# Patient Record
Sex: MALE | Race: WHITE | NOT HISPANIC OR LATINO | Employment: FULL TIME | URBAN - NONMETROPOLITAN AREA
[De-identification: names, ages, dates, MRNs, and addresses within clinical notes are randomized per-mention and may not be internally consistent; named-entity substitution may affect disease eponyms.]

---

## 2018-01-08 ENCOUNTER — LAB (OUTPATIENT)
Dept: LAB | Facility: HOSPITAL | Age: 28
End: 2018-01-08

## 2018-01-08 ENCOUNTER — TRANSCRIBE ORDERS (OUTPATIENT)
Dept: ADMINISTRATIVE | Facility: HOSPITAL | Age: 28
End: 2018-01-08

## 2018-01-08 DIAGNOSIS — Z00.00 ROUTINE GENERAL MEDICAL EXAMINATION AT A HEALTH CARE FACILITY: ICD-10-CM

## 2018-01-08 DIAGNOSIS — Z00.00 ROUTINE GENERAL MEDICAL EXAMINATION AT A HEALTH CARE FACILITY: Primary | ICD-10-CM

## 2018-01-08 LAB
ALBUMIN SERPL-MCNC: 4.2 G/DL (ref 3.5–5)
ALBUMIN/GLOB SERPL: 1.1 G/DL (ref 1.1–2.5)
ALP SERPL-CCNC: 60 U/L (ref 24–120)
ALT SERPL W P-5'-P-CCNC: 124 U/L (ref 0–54)
ANION GAP SERPL CALCULATED.3IONS-SCNC: 7 MMOL/L (ref 4–13)
AST SERPL-CCNC: 57 U/L (ref 7–45)
AUTO MIXED CELLS #: 0.9 10*3/MM3 (ref 0.1–2.6)
AUTO MIXED CELLS %: 13.1 % (ref 0.1–24)
BILIRUB SERPL-MCNC: 0.6 MG/DL (ref 0.1–1)
BUN BLD-MCNC: 9 MG/DL (ref 5–21)
BUN/CREAT SERPL: 9
CALCIUM SPEC-SCNC: 9.8 MG/DL (ref 8.4–10.4)
CHLORIDE SERPL-SCNC: 101 MMOL/L (ref 98–110)
CHOLEST SERPL-MCNC: 260 MG/DL (ref 130–200)
CO2 SERPL-SCNC: 32 MMOL/L (ref 24–31)
CREAT BLD-MCNC: 1 MG/DL (ref 0.5–1.4)
ERYTHROCYTE [DISTWIDTH] IN BLOOD BY AUTOMATED COUNT: 11.4 % (ref 12–15)
GFR SERPL CREATININE-BSD FRML MDRD: 90 ML/MIN/1.73
GLOBULIN UR ELPH-MCNC: 3.9 GM/DL
GLUCOSE BLD-MCNC: 97 MG/DL (ref 70–100)
HCT VFR BLD AUTO: 46.6 % (ref 40–52)
HDLC SERPL-MCNC: 45 MG/DL
HGB BLD-MCNC: 16.2 G/DL (ref 14–18)
LDLC SERPL CALC-MCNC: 143 MG/DL (ref 0–99)
LDLC/HDLC SERPL: 3.17 {RATIO}
LYMPHOCYTES # BLD AUTO: 2.2 10*3/MM3 (ref 0.8–7)
LYMPHOCYTES NFR BLD AUTO: 32.1 % (ref 15–45)
MCH RBC QN AUTO: 30 PG (ref 28–32)
MCHC RBC AUTO-ENTMCNC: 34.8 G/DL (ref 33–36)
MCV RBC AUTO: 86.3 FL (ref 82–95)
NEUTROPHILS # BLD AUTO: 3.7 10*3/MM3 (ref 1.5–8.3)
NEUTROPHILS NFR BLD AUTO: 54.8 % (ref 39–78)
PLATELET # BLD AUTO: 231 10*3/MM3 (ref 130–400)
PMV BLD AUTO: 9.4 FL (ref 6–12)
POTASSIUM BLD-SCNC: 3.9 MMOL/L (ref 3.5–5.3)
PROT SERPL-MCNC: 8.1 G/DL (ref 6.3–8.7)
RBC # BLD AUTO: 5.4 10*6/MM3 (ref 4.2–5.4)
SODIUM BLD-SCNC: 140 MMOL/L (ref 135–145)
TRIGL SERPL-MCNC: 361 MG/DL (ref 0–149)
VLDLC SERPL-MCNC: 72.2 MG/DL
WBC NRBC COR # BLD: 6.8 10*3/MM3 (ref 4.8–10.8)

## 2018-01-08 PROCEDURE — 80061 LIPID PANEL: CPT

## 2018-01-08 PROCEDURE — 85025 COMPLETE CBC W/AUTO DIFF WBC: CPT | Performed by: NURSE PRACTITIONER

## 2018-01-08 PROCEDURE — 80053 COMPREHEN METABOLIC PANEL: CPT | Performed by: NURSE PRACTITIONER

## 2018-01-08 PROCEDURE — 36415 COLL VENOUS BLD VENIPUNCTURE: CPT

## 2018-11-19 ENCOUNTER — OFFICE VISIT (OUTPATIENT)
Dept: URGENT CARE | Age: 28
End: 2018-11-19

## 2018-11-19 VITALS
HEART RATE: 95 BPM | TEMPERATURE: 97.1 F | DIASTOLIC BLOOD PRESSURE: 88 MMHG | BODY MASS INDEX: 36 KG/M2 | HEIGHT: 66 IN | WEIGHT: 224 LBS | SYSTOLIC BLOOD PRESSURE: 124 MMHG | RESPIRATION RATE: 18 BRPM | OXYGEN SATURATION: 96 %

## 2018-11-19 DIAGNOSIS — F17.200 SMOKER: ICD-10-CM

## 2018-11-19 DIAGNOSIS — R05.9 COUGH: ICD-10-CM

## 2018-11-19 DIAGNOSIS — J40 BRONCHITIS: Primary | ICD-10-CM

## 2018-11-19 PROCEDURE — 99202 OFFICE O/P NEW SF 15 MIN: CPT | Performed by: SPECIALIST

## 2018-11-19 PROCEDURE — 96372 THER/PROPH/DIAG INJ SC/IM: CPT | Performed by: SPECIALIST

## 2018-11-19 PROCEDURE — 94640 AIRWAY INHALATION TREATMENT: CPT | Performed by: SPECIALIST

## 2018-11-19 RX ORDER — PREDNISONE 10 MG/1
10 TABLET ORAL DAILY
Qty: 5 TABLET | Refills: 0 | Status: SHIPPED | OUTPATIENT
Start: 2018-11-19 | End: 2018-11-24

## 2018-11-19 RX ORDER — AMOXICILLIN 875 MG/1
875 TABLET, COATED ORAL 2 TIMES DAILY
Qty: 20 TABLET | Refills: 0 | Status: SHIPPED | OUTPATIENT
Start: 2018-11-19 | End: 2018-11-29

## 2018-11-19 RX ORDER — ALBUTEROL SULFATE 2.5 MG/3ML
2.5 SOLUTION RESPIRATORY (INHALATION) ONCE
Status: COMPLETED | OUTPATIENT
Start: 2018-11-19 | End: 2018-11-19

## 2018-11-19 RX ORDER — DEXAMETHASONE SODIUM PHOSPHATE 10 MG/ML
10 INJECTION INTRAMUSCULAR; INTRAVENOUS ONCE
Status: COMPLETED | OUTPATIENT
Start: 2018-11-19 | End: 2018-11-19

## 2018-11-19 RX ADMIN — ALBUTEROL SULFATE 2.5 MG: 2.5 SOLUTION RESPIRATORY (INHALATION) at 17:57

## 2018-11-19 RX ADMIN — DEXAMETHASONE SODIUM PHOSPHATE 10 MG: 10 INJECTION INTRAMUSCULAR; INTRAVENOUS at 17:57

## 2018-11-19 ASSESSMENT — ENCOUNTER SYMPTOMS: COUGH: 1

## 2018-11-20 NOTE — PROGRESS NOTES
of: December 6, 2017  Content Version: 11.8  © 6160-2977 [employer], [employer]. Care instructions adapted under license by [facility] ([facility]). If you have questions about a medical condition or this instruction, always ask your healthcare professional. [address]  any warranty or liability for your use of this information. 1.  Stop smoking  2. Push fluids  3. If your symptoms persist or worsen, return here or follow up at ER you may need a chest x-ray  4.   Complete all of your antibiotics          Electronically signed by ZAY Addison NP on 11/19/2018 at 6:03 PM

## 2018-11-29 ENCOUNTER — HOSPITAL ENCOUNTER (OUTPATIENT)
Dept: GENERAL RADIOLOGY | Age: 28
Discharge: HOME | End: 2018-11-29

## 2018-11-29 ENCOUNTER — OFFICE VISIT (OUTPATIENT)
Dept: URGENT CARE | Age: 28
End: 2018-11-29

## 2018-11-29 VITALS
DIASTOLIC BLOOD PRESSURE: 90 MMHG | TEMPERATURE: 97.7 F | RESPIRATION RATE: 18 BRPM | WEIGHT: 224.6 LBS | HEART RATE: 105 BPM | SYSTOLIC BLOOD PRESSURE: 145 MMHG | BODY MASS INDEX: 36.1 KG/M2 | OXYGEN SATURATION: 97 % | HEIGHT: 66 IN

## 2018-11-29 DIAGNOSIS — R07.81 RIB PAIN ON RIGHT SIDE: ICD-10-CM

## 2018-11-29 DIAGNOSIS — S22.31XA CLOSED FRACTURE OF ONE RIB OF RIGHT SIDE, INITIAL ENCOUNTER: Primary | ICD-10-CM

## 2018-11-29 DIAGNOSIS — F17.200 SMOKER: ICD-10-CM

## 2018-11-29 PROCEDURE — 71100 X-RAY EXAM RIBS UNI 2 VIEWS: CPT

## 2018-11-29 ASSESSMENT — ENCOUNTER SYMPTOMS: COUGH: 1

## 2020-07-14 ENCOUNTER — LAB REQUISITION (OUTPATIENT)
Dept: LAB | Facility: HOSPITAL | Age: 30
End: 2020-07-14

## 2020-07-14 DIAGNOSIS — Z00.00 ENCOUNTER FOR GENERAL ADULT MEDICAL EXAMINATION WITHOUT ABNORMAL FINDINGS: ICD-10-CM

## 2020-07-14 LAB — SARS-COV-2 ORF1AB RESP QL NAA+PROBE: NOT DETECTED

## 2020-07-14 PROCEDURE — U0004 COV-19 TEST NON-CDC HGH THRU: HCPCS | Performed by: NURSE PRACTITIONER

## 2020-07-15 ENCOUNTER — LAB REQUISITION (OUTPATIENT)
Dept: LAB | Facility: HOSPITAL | Age: 30
End: 2020-07-15

## 2020-07-15 DIAGNOSIS — Z00.00 ENCOUNTER FOR GENERAL ADULT MEDICAL EXAMINATION WITHOUT ABNORMAL FINDINGS: ICD-10-CM

## 2020-07-30 ENCOUNTER — LAB REQUISITION (OUTPATIENT)
Dept: LAB | Facility: HOSPITAL | Age: 30
End: 2020-07-30

## 2020-07-30 DIAGNOSIS — Z00.00 ENCOUNTER FOR GENERAL ADULT MEDICAL EXAMINATION WITHOUT ABNORMAL FINDINGS: ICD-10-CM

## 2020-08-07 ENCOUNTER — LAB REQUISITION (OUTPATIENT)
Dept: LAB | Facility: HOSPITAL | Age: 30
End: 2020-08-07

## 2020-08-07 DIAGNOSIS — Z00.00 ENCOUNTER FOR GENERAL ADULT MEDICAL EXAMINATION WITHOUT ABNORMAL FINDINGS: ICD-10-CM

## 2021-06-08 ENCOUNTER — HOSPITAL ENCOUNTER (OUTPATIENT)
Age: 31
Setting detail: OBSERVATION
Discharge: STILL ADMITTED OR ADMINISTRATIVE | End: 2021-06-09
Attending: EMERGENCY MEDICINE | Admitting: FAMILY MEDICINE

## 2021-06-08 DIAGNOSIS — Z91.89 AT RISK FOR WITHDRAWAL: ICD-10-CM

## 2021-06-08 DIAGNOSIS — F10.920 ACUTE ALCOHOLIC INTOXICATION WITHOUT COMPLICATION (HCC): Primary | ICD-10-CM

## 2021-06-08 DIAGNOSIS — F32.A DEPRESSION, UNSPECIFIED DEPRESSION TYPE: ICD-10-CM

## 2021-06-08 LAB
ALBUMIN SERPL-MCNC: 4.5 G/DL (ref 3.5–5.2)
ALP BLD-CCNC: 70 U/L (ref 40–130)
ALT SERPL-CCNC: 107 U/L (ref 5–41)
AMPHETAMINE SCREEN, URINE: NEGATIVE
ANION GAP SERPL CALCULATED.3IONS-SCNC: 13 MMOL/L (ref 7–19)
AST SERPL-CCNC: 111 U/L (ref 5–40)
BACTERIA: NEGATIVE /HPF
BARBITURATE SCREEN URINE: NEGATIVE
BASOPHILS ABSOLUTE: 0.1 K/UL (ref 0–0.2)
BASOPHILS RELATIVE PERCENT: 1.1 % (ref 0–1)
BENZODIAZEPINE SCREEN, URINE: NEGATIVE
BILIRUB SERPL-MCNC: 0.6 MG/DL (ref 0.2–1.2)
BILIRUBIN URINE: NEGATIVE
BLOOD, URINE: NEGATIVE
BUN BLDV-MCNC: 4 MG/DL (ref 6–20)
CALCIUM SERPL-MCNC: 9 MG/DL (ref 8.6–10)
CANNABINOID SCREEN URINE: NEGATIVE
CHLORIDE BLD-SCNC: 104 MMOL/L (ref 98–111)
CLARITY: CLEAR
CO2: 25 MMOL/L (ref 22–29)
COCAINE METABOLITE SCREEN URINE: NEGATIVE
COLOR: YELLOW
CREAT SERPL-MCNC: 0.7 MG/DL (ref 0.5–1.2)
CRYSTALS, UA: ABNORMAL /HPF
EOSINOPHILS ABSOLUTE: 0.1 K/UL (ref 0–0.6)
EOSINOPHILS RELATIVE PERCENT: 1.5 % (ref 0–5)
EPITHELIAL CELLS, UA: 1 /HPF (ref 0–5)
ETHANOL: 328 MG/DL (ref 0–0.08)
GFR AFRICAN AMERICAN: >59
GFR NON-AFRICAN AMERICAN: >60
GLUCOSE BLD-MCNC: 100 MG/DL (ref 74–109)
GLUCOSE URINE: NEGATIVE MG/DL
HCT VFR BLD CALC: 43.1 % (ref 42–52)
HEMOGLOBIN: 15 G/DL (ref 14–18)
HYALINE CASTS: 5 /HPF (ref 0–8)
IMMATURE GRANULOCYTES #: 0 K/UL
KETONES, URINE: ABNORMAL MG/DL
LEUKOCYTE ESTERASE, URINE: NEGATIVE
LIPASE: 84 U/L (ref 13–60)
LYMPHOCYTES ABSOLUTE: 1.6 K/UL (ref 1.1–4.5)
LYMPHOCYTES RELATIVE PERCENT: 35.5 % (ref 20–40)
Lab: NORMAL
MCH RBC QN AUTO: 32.2 PG (ref 27–31)
MCHC RBC AUTO-ENTMCNC: 34.8 G/DL (ref 33–37)
MCV RBC AUTO: 92.5 FL (ref 80–94)
MONOCYTES ABSOLUTE: 0.4 K/UL (ref 0–0.9)
MONOCYTES RELATIVE PERCENT: 9.3 % (ref 0–10)
NEUTROPHILS ABSOLUTE: 2.4 K/UL (ref 1.5–7.5)
NEUTROPHILS RELATIVE PERCENT: 52.6 % (ref 50–65)
NITRITE, URINE: NEGATIVE
OPIATE SCREEN URINE: NEGATIVE
PDW BLD-RTO: 13.2 % (ref 11.5–14.5)
PH UA: 5 (ref 5–8)
PLATELET # BLD: 243 K/UL (ref 130–400)
PMV BLD AUTO: 9.4 FL (ref 9.4–12.4)
POTASSIUM REFLEX MAGNESIUM: 3.9 MMOL/L (ref 3.5–5)
PROTEIN UA: 30 MG/DL
RBC # BLD: 4.66 M/UL (ref 4.7–6.1)
RBC UA: 1 /HPF (ref 0–4)
SARS-COV-2, NAAT: NOT DETECTED
SODIUM BLD-SCNC: 142 MMOL/L (ref 136–145)
SPECIFIC GRAVITY UA: 1.01 (ref 1–1.03)
TOTAL PROTEIN: 7.8 G/DL (ref 6.6–8.7)
UROBILINOGEN, URINE: 1 E.U./DL
WBC # BLD: 4.6 K/UL (ref 4.8–10.8)
WBC UA: 1 /HPF (ref 0–5)

## 2021-06-08 PROCEDURE — 6370000000 HC RX 637 (ALT 250 FOR IP): Performed by: HOSPITALIST

## 2021-06-08 PROCEDURE — 2580000003 HC RX 258: Performed by: HOSPITALIST

## 2021-06-08 PROCEDURE — 87635 SARS-COV-2 COVID-19 AMP PRB: CPT

## 2021-06-08 PROCEDURE — 80307 DRUG TEST PRSMV CHEM ANLYZR: CPT

## 2021-06-08 PROCEDURE — G0378 HOSPITAL OBSERVATION PER HR: HCPCS

## 2021-06-08 PROCEDURE — 83690 ASSAY OF LIPASE: CPT

## 2021-06-08 PROCEDURE — 6360000002 HC RX W HCPCS: Performed by: HOSPITALIST

## 2021-06-08 PROCEDURE — 96374 THER/PROPH/DIAG INJ IV PUSH: CPT

## 2021-06-08 PROCEDURE — 80053 COMPREHEN METABOLIC PANEL: CPT

## 2021-06-08 PROCEDURE — 36415 COLL VENOUS BLD VENIPUNCTURE: CPT

## 2021-06-08 PROCEDURE — 82077 ASSAY SPEC XCP UR&BREATH IA: CPT

## 2021-06-08 PROCEDURE — 85025 COMPLETE CBC W/AUTO DIFF WBC: CPT

## 2021-06-08 PROCEDURE — 81001 URINALYSIS AUTO W/SCOPE: CPT

## 2021-06-08 PROCEDURE — 6370000000 HC RX 637 (ALT 250 FOR IP): Performed by: EMERGENCY MEDICINE

## 2021-06-08 PROCEDURE — 99284 EMERGENCY DEPT VISIT MOD MDM: CPT

## 2021-06-08 RX ORDER — POLYETHYLENE GLYCOL 3350 17 G/17G
17 POWDER, FOR SOLUTION ORAL DAILY PRN
Status: DISCONTINUED | OUTPATIENT
Start: 2021-06-08 | End: 2021-06-09 | Stop reason: HOSPADM

## 2021-06-08 RX ORDER — ACETAMINOPHEN 650 MG/1
650 SUPPOSITORY RECTAL EVERY 6 HOURS PRN
Status: DISCONTINUED | OUTPATIENT
Start: 2021-06-08 | End: 2021-06-09 | Stop reason: HOSPADM

## 2021-06-08 RX ORDER — LORAZEPAM 1 MG/1
4 TABLET ORAL
Status: DISCONTINUED | OUTPATIENT
Start: 2021-06-08 | End: 2021-06-09 | Stop reason: HOSPADM

## 2021-06-08 RX ORDER — DIAZEPAM 5 MG/1
10 TABLET ORAL ONCE
Status: COMPLETED | OUTPATIENT
Start: 2021-06-08 | End: 2021-06-08

## 2021-06-08 RX ORDER — ONDANSETRON 2 MG/ML
4 INJECTION INTRAMUSCULAR; INTRAVENOUS EVERY 6 HOURS PRN
Status: DISCONTINUED | OUTPATIENT
Start: 2021-06-08 | End: 2021-06-09 | Stop reason: HOSPADM

## 2021-06-08 RX ORDER — LORAZEPAM 1 MG/1
3 TABLET ORAL
Status: DISCONTINUED | OUTPATIENT
Start: 2021-06-08 | End: 2021-06-09 | Stop reason: HOSPADM

## 2021-06-08 RX ORDER — SODIUM CHLORIDE 0.9 % (FLUSH) 0.9 %
5-40 SYRINGE (ML) INJECTION EVERY 12 HOURS SCHEDULED
Status: DISCONTINUED | OUTPATIENT
Start: 2021-06-08 | End: 2021-06-09 | Stop reason: HOSPADM

## 2021-06-08 RX ORDER — SODIUM CHLORIDE 9 MG/ML
25 INJECTION, SOLUTION INTRAVENOUS PRN
Status: DISCONTINUED | OUTPATIENT
Start: 2021-06-08 | End: 2021-06-09 | Stop reason: HOSPADM

## 2021-06-08 RX ORDER — LORAZEPAM 2 MG/ML
4 INJECTION INTRAMUSCULAR
Status: DISCONTINUED | OUTPATIENT
Start: 2021-06-08 | End: 2021-06-09

## 2021-06-08 RX ORDER — ACETAMINOPHEN 325 MG/1
650 TABLET ORAL EVERY 6 HOURS PRN
Status: DISCONTINUED | OUTPATIENT
Start: 2021-06-08 | End: 2021-06-09 | Stop reason: HOSPADM

## 2021-06-08 RX ORDER — LORAZEPAM 2 MG/ML
2 INJECTION INTRAMUSCULAR
Status: DISCONTINUED | OUTPATIENT
Start: 2021-06-08 | End: 2021-06-09

## 2021-06-08 RX ORDER — SODIUM CHLORIDE 9 MG/ML
INJECTION, SOLUTION INTRAVENOUS CONTINUOUS
Status: DISCONTINUED | OUTPATIENT
Start: 2021-06-08 | End: 2021-06-09 | Stop reason: HOSPADM

## 2021-06-08 RX ORDER — SODIUM CHLORIDE 0.9 % (FLUSH) 0.9 %
5-40 SYRINGE (ML) INJECTION PRN
Status: DISCONTINUED | OUTPATIENT
Start: 2021-06-08 | End: 2021-06-09 | Stop reason: HOSPADM

## 2021-06-08 RX ORDER — ONDANSETRON 4 MG/1
4 TABLET, ORALLY DISINTEGRATING ORAL EVERY 8 HOURS PRN
Status: DISCONTINUED | OUTPATIENT
Start: 2021-06-08 | End: 2021-06-09 | Stop reason: HOSPADM

## 2021-06-08 RX ORDER — LORAZEPAM 1 MG/1
2 TABLET ORAL
Status: DISCONTINUED | OUTPATIENT
Start: 2021-06-08 | End: 2021-06-09 | Stop reason: HOSPADM

## 2021-06-08 RX ORDER — LORAZEPAM 1 MG/1
1 TABLET ORAL
Status: DISCONTINUED | OUTPATIENT
Start: 2021-06-08 | End: 2021-06-09 | Stop reason: HOSPADM

## 2021-06-08 RX ORDER — CHLORDIAZEPOXIDE HYDROCHLORIDE 10 MG/1
10 CAPSULE, GELATIN COATED ORAL 3 TIMES DAILY
Status: DISCONTINUED | OUTPATIENT
Start: 2021-06-08 | End: 2021-06-09 | Stop reason: HOSPADM

## 2021-06-08 RX ORDER — FOLIC ACID 1 MG/1
1 TABLET ORAL DAILY
Status: DISCONTINUED | OUTPATIENT
Start: 2021-06-08 | End: 2021-06-09 | Stop reason: HOSPADM

## 2021-06-08 RX ORDER — THIAMINE HYDROCHLORIDE 100 MG/ML
100 INJECTION, SOLUTION INTRAMUSCULAR; INTRAVENOUS DAILY
Status: DISCONTINUED | OUTPATIENT
Start: 2021-06-08 | End: 2021-06-09 | Stop reason: HOSPADM

## 2021-06-08 RX ORDER — LORAZEPAM 2 MG/ML
1 INJECTION INTRAMUSCULAR
Status: DISCONTINUED | OUTPATIENT
Start: 2021-06-08 | End: 2021-06-09

## 2021-06-08 RX ORDER — LORAZEPAM 2 MG/ML
3 INJECTION INTRAMUSCULAR
Status: DISCONTINUED | OUTPATIENT
Start: 2021-06-08 | End: 2021-06-09

## 2021-06-08 RX ADMIN — LORAZEPAM 3 MG: 2 INJECTION INTRAMUSCULAR; INTRAVENOUS at 19:46

## 2021-06-08 RX ADMIN — SODIUM CHLORIDE: 9 INJECTION, SOLUTION INTRAVENOUS at 18:28

## 2021-06-08 RX ADMIN — FOLIC ACID 1 MG: 1 TABLET ORAL at 18:31

## 2021-06-08 RX ADMIN — DIAZEPAM 10 MG: 5 TABLET ORAL at 17:31

## 2021-06-08 RX ADMIN — THIAMINE HYDROCHLORIDE 100 MG: 100 INJECTION, SOLUTION INTRAMUSCULAR; INTRAVENOUS at 18:28

## 2021-06-08 ASSESSMENT — ENCOUNTER SYMPTOMS
SHORTNESS OF BREATH: 0
VOMITING: 0
EYE PAIN: 0
ABDOMINAL PAIN: 0
EYE REDNESS: 0
DIARRHEA: 0
VOICE CHANGE: 0
RHINORRHEA: 0
COUGH: 0

## 2021-06-08 ASSESSMENT — PAIN SCALES - GENERAL: PAINLEVEL_OUTOF10: 0

## 2021-06-08 NOTE — PROGRESS NOTES
Ozzie Wright arrived to room # [id number]. Presented with: Homicidal ideation  Mental Status: Patient is oriented, alert, coherent, logical, thought processes intact and able to concentrate and follow conversation. Vitals:    06/08/21 1730   BP: (!) 152/88   Pulse: 110   Resp: 16   Temp:    SpO2: 98%     Patient safety contract and falls prevention contract reviewed with patient Yes. Oriented Patient to room. Call light within reach. Yes.   Needs, issues or concerns expressed at this time: no.      Electronically signed by Zakia Gurera RN on 6/8/2021 at 6:50 PM

## 2021-06-08 NOTE — H&P
History and Physical    Patient Name:  Ykui Tee    :  1990    Chief Complaint:   homicidal ideations     History of Present Illness:   Yuki Tee presents to [facility] brought by [employer] after putting homicidal ideas and plans on facebow. Patient states he got drunk and \"stupid\", however now he still had homicidal thoughts. Patient denies any psychiatric issues but per ER now he as anger issues and a significant drinking problem. States he drinks very heavily daily, sometimes 20-30 beers per night. Per ER note according to [facility] department, patient's family members reported he made several Facebook post recently talking about death and talking about going after certain people. He denies headache, visual problems, N/V/abd pain/D/C, chest pain, dyspnea, dysuria, muscle or joint pain. Past Medical History:  None     Surgical History:  None     Social History:   reports that he has been smoking cigarettes. He has been smoking about 1.00 pack per day. He has never used smokeless tobacco. He reports current alcohol use. He reports that he does not use drugs. Family History:  HTN father    Medications:  None     Allergies:  Patient has no known allergies. Review of Systems:   · Constitutional: there has been no unanticipated weight loss. There's been no change in energy level, sleep pattern, or activity level. · Eyes: No visual changes or diplopia. No scleral icterus. · ENT: No Headaches, hearing loss or vertigo. No mouth sores or sore throat. · Cardiovascular: No chest pain, palpitations or loss of consciousness. No pleuritic pain or phlebitis. No orthopnea, PND or peripheral edema. · Respiratory: No cough or wheezing, no sputum production. No hemoptysis. No dyspnea     · Gastrointestinal: No abdominal pain, appetite loss, blood in stools. No change in bowel habits. No N/V. No blood per rectum.    · Genitourinary: No dysuria, trouble voiding, or strength intact. Neuro: Cranial nerves intact. Motor: Strength 5/5 in all extremities. No focal weakness. Sensory: grossly normal to touch. Pertinent Labs:  CBC:   Recent Labs     06/08/21  1628   WBC 4.6*   RBC 4.66*   HGB 15.0   HCT 43.1   MCV 92.5   MCH 32.2*   MCHC 34.8   RDW 13.2      MPV 9.4     BMP:   Recent Labs     06/08/21  1628 06/09/21  0641    141   K 3.9 4.2    104   CO2 25 26   BUN 4* 5*   CREATININE 0.7 0.7   GLUCOSE 100 89   CALCIUM 9.0 8.9     ABGs: No results for input(s): PO2, PCO2, PH, HCO3, BE, O2SAT in the last 72 hours. INR: No results for input(s): INR, PROTIME in the last 72 hours. BNP:  No results found for: BNP  TSH: No results found for: TSH  Cardiac Injury Profile: No results found for: CKTOTAL, CKMB, CKMBINDEX, TROPONINI  Lipid Profile: No components found for: CHLPL  No results found for: TRIG  No results found for: HDL  No results found for: LDLCALC  No results found for: LABVLDL  Hemoglobin A1C: No results found for: LABA1C  No results found for: EAG      Assessment/Plan:  · Homicidal ideation  - psych consult  · Alcohol intoxication- fluids, vitamins   · Possible withdrawal- librium, ciwa                  I have reviewed my findings and recommendations in detail with Angeline Caballero.     Jing Merino MD

## 2021-06-08 NOTE — ED PROVIDER NOTES
[facility] [address] [facility]      Pt Name: Petty Reece  MRN: [id number]  [place] 1990  Date of evaluation: 6/8/2021  Provider: Jim Lozoya MD    CHIEF COMPLAINT       Chief Complaint   Patient presents with    Alcohol Intoxication     Pt states he has drank 20 beers today. HISTORY OF PRESENT ILLNESS   (Location/Symptom, Timing/Onset,Context/Setting, Quality, Duration, Modifying Factors, Severity)  Note limiting factors. Petty Reece is a 27 y.o. male who presents to the emergency department for evaluation after [employer] was involved with him on several instances today. Patient states he has had anger issues and a significant drinking problem. States he drinks very heavily daily, sometimes 20-30 beers per night. States he has had shaking and tremors for withdrawals before but has never quit drinking wine after having more serious withdrawal symptoms. According to [facility] department, patient's family members reported he made several Facebook post recently talking about death and talking about going after certain people. He currently denies any suicidal or homicidal thoughts but does report some feelings of depression and hopelessness recently which he has mainly attributed to his alcohol abuse. Patient states he is interested in quitting drinking but does not know how. HPI    NursingNotes were reviewed. REVIEW OF SYSTEMS    (2-9 systems for level 4, 10 or more for level 5)     Review of Systems   Constitutional: Negative for fatigue and fever. HENT: Negative for congestion, rhinorrhea and voice change. Eyes: Negative for pain and redness. Respiratory: Negative for cough and shortness of breath. Cardiovascular: Negative for chest pain. Gastrointestinal: Negative for abdominal pain, diarrhea and vomiting. Endocrine: Negative. Genitourinary: Negative. Musculoskeletal: Negative for arthralgias and gait problem.    Skin: Negative for rash and wound. Neurological: Positive for tremors. Negative for weakness and headaches. Hematological: Negative. Psychiatric/Behavioral: Positive for agitation and dysphoric mood. The patient is nervous/anxious. All other systems reviewed and are negative. A complete review of systems was performed and is negative except as noted above in the HPI. PAST MEDICAL HISTORY   History reviewed. No pertinent past medical history. SURGICAL HISTORY     History reviewed. No pertinent surgical history. CURRENT MEDICATIONS       There are no discharge medications for this patient. ALLERGIES     Patient has no known allergies. FAMILY HISTORY     History reviewed. No pertinent family history. SOCIAL HISTORY       Social History     Socioeconomic History    Marital status: Unknown     Spouse name: None    Number of children: None    Years of education: None    Highest education level: None   Occupational History    None   Tobacco Use    Smoking status: Current Every Day Smoker     Packs/day: 1.00     Types: Cigarettes    Smokeless tobacco: Never Used   Vaping Use    Vaping Use: Every day    Substances: Nicotine, Flavoring    Devices: Pre-filled or refillable cartridge    Passive vaping exposure Yes   Substance and Sexual Activity    Alcohol use: Yes     Comment: 20 pack    Drug use: No    Sexual activity: None   Other Topics Concern    None   Social History Narrative    None     Social Determinants of Health     Financial Resource Strain:     Difficulty of Paying Living Expenses:    Food Insecurity:     Worried About Running Out of Food in the Last Year:     Ran Out of Food in the Last Year:    Transportation Needs:     Lack of Transportation (Medical):      Lack of Transportation (Non-Medical):    Physical Activity:     Days of Exercise per Week:     Minutes of Exercise per Session:    Stress:     Feeling of Stress :    Social Connections:     Frequency of Communication with Friends and Family:     Frequency of Social Gatherings with Friends and Family:     Attends Advent Services:     Active Member of Clubs or Organizations:     Attends Club or Organization Meetings:     Marital Status:    Intimate Partner Violence:     Fear of Current or Ex-Partner:     Emotionally Abused:     Physically Abused:     Sexually Abused:        SCREENINGS    [place] Com[place] Scale  Eye Opening: Spontaneous  Best Verbal Response: Oriented  Best Motor Response: Obeys commands  [place] Scale Score: 15        PHYSICAL EXAM    (up to 7 for level 4, 8 or more for level 5)     ED Triage Vitals [06/08/21 1522]   BP Temp Temp Source Pulse Resp SpO2 Height Weight   (!) 155/93 98.2 °F (36.8 °C) Oral 123 16 98 % 5' 6\" (1.676 m) 224 lb (101.6 kg)       Physical Exam  Vitals and nursing note reviewed. Constitutional:       General: He is not in acute distress. Appearance: He is well-developed. He is not toxic-appearing or diaphoretic. HENT:      Head: Normocephalic and atraumatic. Eyes:      General: No scleral icterus. Right eye: No discharge. Left eye: No discharge. Pupils: Pupils are equal, round, and reactive to light. Cardiovascular:      Rate and Rhythm: Normal rate and regular rhythm. Pulmonary:      Effort: Pulmonary effort is normal. No respiratory distress. Breath sounds: No stridor. Abdominal:      General: There is no distension. Musculoskeletal:         General: No deformity. Normal range of motion. Cervical back: Normal range of motion. Skin:     General: Skin is warm and dry. Neurological:      Mental Status: He is alert and oriented to person, place, and time. GCS: GCS eye subscore is 4. GCS verbal subscore is 5. GCS motor subscore is 6. Cranial Nerves: No cranial nerve deficit. Motor: No abnormal muscle tone. Psychiatric:         Mood and Affect: Mood is anxious. Affect is labile.          Behavior: Behavior normal.         Thought Content: Thought content normal.         Judgment: Judgment normal.         DIAGNOSTIC RESULTS     EKG: All EKG's are interpreted by the Emergency Department Physician who either signs or Co-signs this chart in the absence of a cardiologist.        RADIOLOGY:   Non-plain film images such as CT, Ultrasound and MRI are read by the radiologist. [facility] images are visualized and preliminarily interpreted by the emergency physician with the below findings:      Interpretation per the Radiologist below, if available at the time of this note:    No orders to display         ED BEDSIDE ULTRASOUND:   Performed by ED Physician - none    LABS:  Labs Reviewed   CBC WITH AUTO DIFFERENTIAL - Abnormal; Notable for the following components:       Result Value    WBC 4.6 (*)     RBC 4.66 (*)     MCH 32.2 (*)     Basophils % 1.1 (*)     All other components within normal limits   COMPREHENSIVE METABOLIC PANEL W/ REFLEX TO MG FOR LOW K - Abnormal; Notable for the following components:    BUN 4 (*)      (*)      (*)     All other components within normal limits   LIPASE - Abnormal; Notable for the following components:    Lipase 84 (*)     All other components within normal limits   URINALYSIS - Abnormal; Notable for the following components:    Ketones, Urine TRACE (*)     Protein, UA 30 (*)     All other components within normal limits   MICROSCOPIC URINALYSIS - Abnormal; Notable for the following components:    Bacteria, UA NEGATIVE (*)     Crystals, UA NEG (*)     All other components within normal limits   COVID-19, RAPID   ETHANOL   URINE DRUG SCREEN   COMPREHENSIVE METABOLIC PANEL   LIPASE       All other labs were within normal range or not returned as of this dictation.     Medications   sodium chloride flush 0.9 % injection 5-40 mL (has no administration in time range)   sodium chloride flush 0.9 % injection 5-40 mL (has no administration in time range)   0.9 % sodium chloride infusion (has no administration in time range)   enoxaparin (LOVENOX) injection 40 mg (has no administration in time range)   ondansetron (ZOFRAN-ODT) disintegrating tablet 4 mg (has no administration in time range)     Or   ondansetron (ZOFRAN) injection 4 mg (has no administration in time range)   polyethylene glycol (GLYCOLAX) packet 17 g (has no administration in time range)   acetaminophen (TYLENOL) tablet 650 mg (has no administration in time range)     Or   acetaminophen (TYLENOL) suppository 650 mg (has no administration in time range)   thiamine (B-1) injection 100 mg (100 mg Intravenous Given 6/8/21 1828)   LORazepam (ATIVAN) tablet 1 mg ( Oral See Alternative 6/8/21 1946)     Or   LORazepam (ATIVAN) injection 1 mg ( Intravenous See Alternative 6/8/21 1946)     Or   LORazepam (ATIVAN) tablet 2 mg ( Oral See Alternative 6/8/21 1946)     Or   LORazepam (ATIVAN) injection 2 mg ( Intravenous See Alternative 6/8/21 1946)     Or   LORazepam (ATIVAN) tablet 3 mg ( Oral See Alternative 6/8/21 1946)     Or   LORazepam (ATIVAN) injection 3 mg (3 mg Intravenous Given 6/8/21 1946)     Or   LORazepam (ATIVAN) tablet 4 mg ( Oral See Alternative 6/8/21 1946)     Or   LORazepam (ATIVAN) injection 4 mg ( Intravenous See Alternative 6/8/21 1946)   chlordiazePOXIDE (LIBRIUM) capsule 10 mg (has no administration in time range)   0.9 % sodium chloride infusion ( Intravenous New Bag 3/1/25 7461)   folic acid (FOLVITE) tablet 1 mg (1 mg Oral Given 6/8/21 1831)   diazePAM (VALIUM) tablet 10 mg (10 mg Oral Given 6/8/21 1731)       EMERGENCY DEPARTMENT COURSE and DIFFERENTIALDIAGNOSIS/MDM:   Vitals:    Vitals:    06/08/21 1703 06/08/21 1730 06/08/21 1943 06/08/21 2022   BP: (!) 154/89 (!) 152/88 (!) 167/92 (!) 98/51   Pulse: 115 110 115 73   Resp: 16 16  16   Temp:    97.3 °F (36.3 °C)   TempSrc:       SpO2:  98%  91%   Weight:       Height:           MDM  Patient does not currently endorse any suicidal thoughts but given the reported history is felt to warrant sober psychiatric evaluation after he allowed to sober and potential withdrawal is treated. Based on the evaluation and work-up here patient is felt to require further monitoring, work-up, or treatment that is available in the emergency department. Case was discussed with hospitalist who agrees for observation or admission for further management. Treatment and stabilization as necessary were provided in the emergency department prior to transfer of care to the medicine service. CONSULTS:  IP CONSULT TO SOCIAL WORK  IP CONSULT TO PSYCHIATRY    PROCEDURES:  Unless otherwise notedbelow, none     Procedures      FINAL IMPRESSION     1. Acute alcoholic intoxication without complication ([facility] [address]    2. At risk for withdrawal    3. Depression, unspecified depression type          DISPOSITION/PLAN   DISPOSITION Admitted 06/08/2021 05:45:28 PM      PATIENT REFERRED TO:  No follow-up provider specified. DISCHARGE MEDICATIONS:  There are no discharge medications for this patient.          (Please note that portions of this note were completed with a voice recognition program.  Efforts were made to edit the dictations butoccasionally words are mis-transcribed.)    Jorje Escalera MD (electronically signed)  AttendingEmerSaint Mary's Regional Medical Centercy Physician          Jorje Martínez MD  06/08/21 2474

## 2021-06-09 ENCOUNTER — HOSPITAL ENCOUNTER (INPATIENT)
Age: 31
LOS: 5 days | Discharge: HOME | DRG: 885 | End: 2021-06-14
Attending: PSYCHIATRY & NEUROLOGY | Admitting: PSYCHIATRY & NEUROLOGY

## 2021-06-09 VITALS
BODY MASS INDEX: 36 KG/M2 | HEART RATE: 84 BPM | WEIGHT: 224 LBS | OXYGEN SATURATION: 96 % | TEMPERATURE: 98.1 F | RESPIRATION RATE: 16 BRPM | SYSTOLIC BLOOD PRESSURE: 143 MMHG | DIASTOLIC BLOOD PRESSURE: 72 MMHG | HEIGHT: 66 IN

## 2021-06-09 PROBLEM — Z72.0 TOBACCO USE: Status: ACTIVE | Noted: 2021-06-09

## 2021-06-09 PROBLEM — R45.850 HOMICIDAL IDEATION: Status: ACTIVE | Noted: 2021-06-09

## 2021-06-09 PROBLEM — R74.01 TRANSAMINITIS: Status: ACTIVE | Noted: 2021-06-09

## 2021-06-09 PROBLEM — F10.930 ALCOHOL WITHDRAWAL SYNDROME WITHOUT COMPLICATION (HCC): Status: ACTIVE | Noted: 2021-06-09

## 2021-06-09 PROBLEM — F10.930 ALCOHOL WITHDRAWAL SYNDROME WITHOUT COMPLICATION (HCC): Status: RESOLVED | Noted: 2021-06-09 | Resolved: 2021-06-09

## 2021-06-09 PROBLEM — F10.920 ACUTE ALCOHOL INTOXICATION, UNCOMPLICATED (HCC): Status: RESOLVED | Noted: 2021-06-08 | Resolved: 2021-06-09

## 2021-06-09 LAB
ALBUMIN SERPL-MCNC: 4.4 G/DL (ref 3.5–5.2)
ALP BLD-CCNC: 67 U/L (ref 40–130)
ALT SERPL-CCNC: 93 U/L (ref 5–41)
ANION GAP SERPL CALCULATED.3IONS-SCNC: 11 MMOL/L (ref 7–19)
AST SERPL-CCNC: 99 U/L (ref 5–40)
BILIRUB SERPL-MCNC: 1.3 MG/DL (ref 0.2–1.2)
BUN BLDV-MCNC: 5 MG/DL (ref 6–20)
CALCIUM SERPL-MCNC: 8.9 MG/DL (ref 8.6–10)
CHLORIDE BLD-SCNC: 104 MMOL/L (ref 98–111)
CO2: 26 MMOL/L (ref 22–29)
CREAT SERPL-MCNC: 0.7 MG/DL (ref 0.5–1.2)
GFR AFRICAN AMERICAN: >59
GFR NON-AFRICAN AMERICAN: >60
GLUCOSE BLD-MCNC: 89 MG/DL (ref 74–109)
LIPASE: 53 U/L (ref 13–60)
POTASSIUM SERPL-SCNC: 4.2 MMOL/L (ref 3.5–5)
SODIUM BLD-SCNC: 141 MMOL/L (ref 136–145)
TOTAL PROTEIN: 7.3 G/DL (ref 6.6–8.7)

## 2021-06-09 PROCEDURE — 6370000000 HC RX 637 (ALT 250 FOR IP): Performed by: HOSPITALIST

## 2021-06-09 PROCEDURE — 80053 COMPREHEN METABOLIC PANEL: CPT

## 2021-06-09 PROCEDURE — 1240000000 HC EMOTIONAL WELLNESS R&B

## 2021-06-09 PROCEDURE — 83690 ASSAY OF LIPASE: CPT

## 2021-06-09 PROCEDURE — 36415 COLL VENOUS BLD VENIPUNCTURE: CPT

## 2021-06-09 PROCEDURE — 96375 TX/PRO/DX INJ NEW DRUG ADDON: CPT

## 2021-06-09 PROCEDURE — 6370000000 HC RX 637 (ALT 250 FOR IP): Performed by: PSYCHIATRY & NEUROLOGY

## 2021-06-09 PROCEDURE — 6370000000 HC RX 637 (ALT 250 FOR IP): Performed by: FAMILY MEDICINE

## 2021-06-09 PROCEDURE — 99251 PR INITL INPATIENT CONSULT NEW/ESTAB PT 20 MIN: CPT | Performed by: PSYCHIATRY & NEUROLOGY

## 2021-06-09 PROCEDURE — G0378 HOSPITAL OBSERVATION PER HR: HCPCS

## 2021-06-09 PROCEDURE — 2580000003 HC RX 258: Performed by: HOSPITALIST

## 2021-06-09 PROCEDURE — 96372 THER/PROPH/DIAG INJ SC/IM: CPT

## 2021-06-09 PROCEDURE — 96376 TX/PRO/DX INJ SAME DRUG ADON: CPT

## 2021-06-09 PROCEDURE — 6360000002 HC RX W HCPCS: Performed by: HOSPITALIST

## 2021-06-09 RX ORDER — FOLIC ACID 1 MG/1
1 TABLET ORAL DAILY
Status: CANCELLED | OUTPATIENT
Start: 2021-06-10

## 2021-06-09 RX ORDER — CHLORDIAZEPOXIDE HYDROCHLORIDE 10 MG/1
10 CAPSULE, GELATIN COATED ORAL 3 TIMES DAILY
Status: CANCELLED | OUTPATIENT
Start: 2021-06-09

## 2021-06-09 RX ORDER — THIAMINE HYDROCHLORIDE 100 MG/ML
100 INJECTION, SOLUTION INTRAMUSCULAR; INTRAVENOUS DAILY
Status: CANCELLED | OUTPATIENT
Start: 2021-06-10

## 2021-06-09 RX ORDER — POLYETHYLENE GLYCOL 3350 17 G/17G
17 POWDER, FOR SOLUTION ORAL DAILY PRN
Status: DISCONTINUED | OUTPATIENT
Start: 2021-06-09 | End: 2021-06-14 | Stop reason: HOSPADM

## 2021-06-09 RX ORDER — LORAZEPAM 1 MG/1
2 TABLET ORAL EVERY 4 HOURS PRN
Status: DISCONTINUED | OUTPATIENT
Start: 2021-06-09 | End: 2021-06-14

## 2021-06-09 RX ORDER — ACETAMINOPHEN 650 MG/1
650 SUPPOSITORY RECTAL EVERY 6 HOURS PRN
Status: CANCELLED | OUTPATIENT
Start: 2021-06-09

## 2021-06-09 RX ORDER — NICOTINE 21 MG/24HR
1 PATCH, TRANSDERMAL 24 HOURS TRANSDERMAL DAILY
Status: DISCONTINUED | OUTPATIENT
Start: 2021-06-09 | End: 2021-06-14 | Stop reason: HOSPADM

## 2021-06-09 RX ORDER — ACETAMINOPHEN 325 MG/1
650 TABLET ORAL EVERY 6 HOURS PRN
Status: CANCELLED | OUTPATIENT
Start: 2021-06-09

## 2021-06-09 RX ORDER — ONDANSETRON 4 MG/1
4 TABLET, ORALLY DISINTEGRATING ORAL EVERY 8 HOURS PRN
Status: CANCELLED | OUTPATIENT
Start: 2021-06-09

## 2021-06-09 RX ORDER — LANOLIN ALCOHOL/MO/W.PET/CERES
3 CREAM (GRAM) TOPICAL NIGHTLY
Status: DISCONTINUED | OUTPATIENT
Start: 2021-06-09 | End: 2021-06-14 | Stop reason: HOSPADM

## 2021-06-09 RX ORDER — ONDANSETRON 2 MG/ML
4 INJECTION INTRAMUSCULAR; INTRAVENOUS EVERY 6 HOURS PRN
Status: CANCELLED | OUTPATIENT
Start: 2021-06-09

## 2021-06-09 RX ORDER — LORAZEPAM 1 MG/1
1 TABLET ORAL EVERY 4 HOURS PRN
Status: DISCONTINUED | OUTPATIENT
Start: 2021-06-09 | End: 2021-06-14

## 2021-06-09 RX ORDER — POLYETHYLENE GLYCOL 3350 17 G/17G
17 POWDER, FOR SOLUTION ORAL DAILY PRN
Status: CANCELLED | OUTPATIENT
Start: 2021-06-09

## 2021-06-09 RX ORDER — NICOTINE 21 MG/24HR
1 PATCH, TRANSDERMAL 24 HOURS TRANSDERMAL DAILY
Status: CANCELLED | OUTPATIENT
Start: 2021-06-10

## 2021-06-09 RX ORDER — NICOTINE 21 MG/24HR
1 PATCH, TRANSDERMAL 24 HOURS TRANSDERMAL DAILY
Status: DISCONTINUED | OUTPATIENT
Start: 2021-06-09 | End: 2021-06-09 | Stop reason: HOSPADM

## 2021-06-09 RX ORDER — ACETAMINOPHEN 325 MG/1
650 TABLET ORAL EVERY 4 HOURS PRN
Status: DISCONTINUED | OUTPATIENT
Start: 2021-06-09 | End: 2021-06-14 | Stop reason: HOSPADM

## 2021-06-09 RX ADMIN — CHLORDIAZEPOXIDE HYDROCHLORIDE 10 MG: 10 CAPSULE ORAL at 14:17

## 2021-06-09 RX ADMIN — CHLORDIAZEPOXIDE HYDROCHLORIDE 10 MG: 10 CAPSULE ORAL at 08:30

## 2021-06-09 RX ADMIN — Medication 3 MG: at 21:19

## 2021-06-09 RX ADMIN — THIAMINE HYDROCHLORIDE 100 MG: 100 INJECTION, SOLUTION INTRAMUSCULAR; INTRAVENOUS at 08:36

## 2021-06-09 RX ADMIN — ENOXAPARIN SODIUM 40 MG: 40 INJECTION SUBCUTANEOUS at 08:32

## 2021-06-09 RX ADMIN — LORAZEPAM 2 MG: 2 INJECTION INTRAMUSCULAR; INTRAVENOUS at 08:32

## 2021-06-09 RX ADMIN — SODIUM CHLORIDE, PRESERVATIVE FREE 10 ML: 5 INJECTION INTRAVENOUS at 10:21

## 2021-06-09 RX ADMIN — FOLIC ACID 1 MG: 1 TABLET ORAL at 08:30

## 2021-06-09 RX ADMIN — LORAZEPAM 1 MG: 1 TABLET ORAL at 21:19

## 2021-06-09 ASSESSMENT — SLEEP AND FATIGUE QUESTIONNAIRES
DIFFICULTY ARISING: YES
DIFFICULTY STAYING ASLEEP: NO
RESTFUL SLEEP: YES
AVERAGE NUMBER OF SLEEP HOURS: 5
DIFFICULTY FALLING ASLEEP: NO
DO YOU HAVE DIFFICULTY SLEEPING: NO

## 2021-06-09 ASSESSMENT — LIFESTYLE VARIABLES: HISTORY_ALCOHOL_USE: YES

## 2021-06-09 ASSESSMENT — PATIENT HEALTH QUESTIONNAIRE - PHQ9: SUM OF ALL RESPONSES TO PHQ QUESTIONS 1-9: 3

## 2021-06-09 NOTE — SUICIDE SAFETY PLAN
SAFETY PLAN    A suicide Safety Plan is a document that supports someone when they are having thoughts of suicide. Warning Signs that indicate a suicidal crisis may be developing: What (situations, thoughts, feelings, body sensations, behaviors, etc.) do you experience that lets you know you are beginning to think about suicide? 1. Bad mood at times  2. Living with sister at 27  3. Trying to move forward make money    Internal Coping Strategies:  What things can I do (relaxation techniques, hobbies, physical activities, etc.) to take my mind off my problems without contacting another person? 1. Relax by myself  2. Listen to the music, and pet the dog  3. Drink alcohol     People and social settings that provide distraction: Who can I call or where can I go to distract me? 1. Name: work friends   2. Name: Ranjeet Snow    3. Place: [facility]                People whom I can ask for help: Who can I call when I need help - for example, friends, family, clergy, someone else? 1. Name: Kiersten julia                Phone: [phone]  2. Name: Keith Mccarthy   Phone: [phone]  3. Name: Cha Mariesterling   Phone: [phone]    Professionals or [address] [place] [address] Blvd I can contact during a crisis: Who can I call for help - for example, my doctor, my psychiatrist, my psychologist, a mental health provider, a suicide hotline? 1. Clinician Name: [id number] Moreno   Phone: [phone]      Clinician Pager or Emergency Contact #: [phone]    2. Clinician Name: [address]   Phone: [phone]      Clinician Pager or Emergency Contact #: [phone]    3. Suicide Prevention Lifeline: 9-883-532-TALK (6170)    4. [address] [place] [address] Emergency Services -  for example, [facility], [facility] suicide hotline, [facility] Hotline: [facility] IVELISSE DALY Emergency Department      Emergency Services Address: [address].  [place] [facility] Phone: [phone]    Making the environment safe: How can I make my environment (house/apartment/living space) safer? For example, can I remove guns, medications, and other items? 1. Remove weapons from home  2.  Remove extra medications from home

## 2021-06-09 NOTE — CONSULTS
[facility]  Psychiatry Consult    Reason for Consult: SI and HI    27 y.o. white male with history of alcohol abuse, who presented to the emergency department for evaluation after [employer] was involved with him on several instances in one day. According to [address], patient's family reported that he made several Facebook posts recently talking about death and going after certain people. Patient denied suicidal or homicidal thoughts in the ER but reported depression and hopelessness which he attributed to his alcohol abuse. Patient stated he has has anger issues and a significant drinking problem. UDS negative,  . CIWA score 13 at 8 AM today. Patient presents with superficially bright affect this morning. He denies withdrawal symptoms. States he was drunk when posted on Facebook. He admits to feeling depressed, hopeless and helpless. Reports racing thoughts and mood swings. Has been having anger issues. He has never been treated by a psychiatrist.  States that he is struggling with quitting drinking. He would like help. Feels that he would benefit from inpatient psychiatric treatment. Patient currently lives with sister who is supportive. He is employed but worries that he might lose his job due to his drinking habits. Psychiatric History:    Diagnoses: Denies  Suicide attempts/gestures: Denies   Prior hospitalizations: Denies   Medication trials: Denies   Mental health contact: None  Head trauma: Denies    Substance Use History:  Longstanding history of alcohol abuse. States he drinks heavily daily, sometimes 20-30 beers per night. States he has had shaking and tremors before but has never quit drinking wine after having more serious withdrawal symptoms. No h/o w/d seizures. Denies illicit drug use. Vapes. Allergies:  Patient has no known allergies. Medical History:  History reviewed. No pertinent past medical history.     Family History:  No history of mental illness or suicide attempts. Social History:  Single, no kids. Lives with sister. Works as a . No legal charges. Review of Systems: 14 point review of systems negative except as described above    Vitals:    06/09/21 0724   BP: (!) 156/85   Pulse: 86   Resp: 16   Temp: 98.2 °F (36.8 °C)   SpO2: 96%       Mental Status  Appearance: Disheveled and in hospital attire. Made good eye contact. Gait stable. No abnormal movements or tremor. Behavior: Calm, cooperative, and socially appropriate. No psychomotor retardation/agitation appreciated. Speech: Normal in tone, volume, and quality. No slurring, dysarthria or pressured speech noted. Mood: \"Depressed\"   Affect: Mood incongruent. Thought Process: Appears linear  Thought Content: Denies active suicidal and homicidal ideation. Voices hopelessness and helplessness. No overt delusions or paranoia appreciated. Perceptions: Denies auditory or visual hallucinations at present time. Not responding to internal stimuli. Concentration: Intact. Orientation: to person, place, date, and situation. Language: Intact. Fund of information: Intact. Memory: Recent and remote appear intact. Impulsivity: Questionable. Neurovegitative: Fair appetite and sleep. Insight: Impaired. Judgment: Impaired. Assessment  DSM-5 DIAGNOSIS:  Impression   Depression unspecified   Rule out substance-induced mood disorder  Alcohol withdrawal  Alcohol use disorder, severe  Nicotine dependence    Patient endorses depression, hopelessness and helplessness. Given his risk factors, he is posing danger to self and others at this time. He is meeting the criteria for inpatient psychiatric treatment. Please transfer today pending negative COVID-19 test.      Thank you for consulting our service. Please call us with questions.        Ainsley Chappell MD

## 2021-06-09 NOTE — DISCHARGE SUMMARY
24HR INTAKE/OUTPUT:  No intake or output data in the 24 hours ending 06/09/21 1421  General appearance: alert and cooperative with exam  HEENT: atraumatic, eyes with clear conjunctiva and normal lids, pupils and irises normal, external ears and nose are normal, lips normal. Neck without masses, lympadenopathy, bruit, thyroid normal  Lungs: no increased work of breathing, clear to auscultation bilaterally without rales, rhonchi or wheezes  Heart: regular rate and rhythm, S1, S2 normal, no murmur, click, rub or gallop  Abdomen: soft, non-tender; bowel sounds normal; no masses,  no organomegaly and obese  Extremities: extremities normal without clubbing, atraumatic, no cyanosis or edema  Neurologic: no focal neurologic deficits, normal sensation, alert and oriented, affect and mood appropriate  Skin: no jaundice, rashes, or nodules    Discharge Medications: There are no discharge medications for this patient. Discharge Instructions: Follow up with No primary care provider on file. upon discharge. Take medications as directed. Resume activity as tolerated. Diet: Diet NPO     Disposition: Patient is medically stable and will be discharged to behavioral health unit. Time spent on discharge 20 minutes.     Signed:  Nieves Basurto DO
No

## 2021-06-10 PROBLEM — F33.2 SEVERE EPISODE OF RECURRENT MAJOR DEPRESSIVE DISORDER (HCC): Status: ACTIVE | Noted: 2021-06-10

## 2021-06-10 PROBLEM — F10.21 ALCOHOL USE DISORDER, SEVERE, IN EARLY REMISSION, IN CONTROLLED ENVIRONMENT, DEPENDENCE (HCC): Status: ACTIVE | Noted: 2021-06-10

## 2021-06-10 PROBLEM — F33.3 SEVERE RECURRENT MAJOR DEPRESSION WITH PSYCHOTIC FEATURES (HCC): Status: ACTIVE | Noted: 2021-06-10

## 2021-06-10 PROCEDURE — 1240000000 HC EMOTIONAL WELLNESS R&B

## 2021-06-10 PROCEDURE — 6370000000 HC RX 637 (ALT 250 FOR IP): Performed by: NURSE PRACTITIONER

## 2021-06-10 PROCEDURE — 90792 PSYCH DIAG EVAL W/MED SRVCS: CPT | Performed by: NURSE PRACTITIONER

## 2021-06-10 PROCEDURE — 6370000000 HC RX 637 (ALT 250 FOR IP): Performed by: PSYCHIATRY & NEUROLOGY

## 2021-06-10 RX ORDER — CLONIDINE HYDROCHLORIDE 0.1 MG/1
0.1 TABLET ORAL EVERY 4 HOURS PRN
Status: DISCONTINUED | OUTPATIENT
Start: 2021-06-10 | End: 2021-06-14 | Stop reason: HOSPADM

## 2021-06-10 RX ORDER — SERTRALINE HYDROCHLORIDE 25 MG/1
25 TABLET, FILM COATED ORAL DAILY
Status: DISCONTINUED | OUTPATIENT
Start: 2021-06-10 | End: 2021-06-14

## 2021-06-10 RX ORDER — NALTREXONE HYDROCHLORIDE 50 MG/1
50 TABLET, FILM COATED ORAL
Status: DISCONTINUED | OUTPATIENT
Start: 2021-06-11 | End: 2021-06-14

## 2021-06-10 RX ADMIN — SERTRALINE HYDROCHLORIDE 25 MG: 25 TABLET ORAL at 11:16

## 2021-06-10 RX ADMIN — Medication 3 MG: at 20:41

## 2021-06-10 RX ADMIN — CLONIDINE HYDROCHLORIDE 0.1 MG: 0.1 TABLET ORAL at 20:43

## 2021-06-10 ASSESSMENT — PAIN SCALES - GENERAL
PAINLEVEL_OUTOF10: 0
PAINLEVEL_OUTOF10: 0

## 2021-06-10 NOTE — H&P
Behavioral Services  Medicare Certification Upon Admission    I certify that this patient's inpatient psychiatric hospital admission is medically necessary for:    [x] (1) Treatment which could reasonably be expected to improve this patient's condition,       [] (2) Or for diagnostic study;     AND     [x](2) The inpatient psychiatric services are provided while the individual is under the care of a physician and are included in the individualized plan of care.     Estimated length of stay/service 5 days- 7 weeks    Plan for post-hospital care :TBA    Electronically signed by ZAY Bourgeois on 6/10/2021 at 11:34 AM

## 2021-06-10 NOTE — PROGRESS NOTES
Group Therapy Note    Start Time: 800  End Time:  900  Number of Participants: 8    Type of Group: Community Meeting       Patient's Goal:  \" Relax and reflect, read book watch movie or TV \"      Notes:        Participation Level:  Active Listener       Participation Quality: Appropriate      Thought Process/Content: Logical      Affective Functioning: Congruent      Mood: Calm      Level of consciousness:  Alert      Modes of Intervention: Support      Discipline Responsible: Behavioral Health Tech II      Signature:  Selena Fisher

## 2021-06-10 NOTE — PROGRESS NOTES
Group Note    Number of Participants in Group: 5  Number of Patients on Unit:7      Patient attended group:Yes  Reason for Absence:  Intervention for patient absence:        Type of Group:   Wrap-Up/Relaxation    Patient's Goal: See wrap up group sheet    Participation Level:     Active Listener, Interactive, Monopolizing, Minimal and None           Patient Response to Learning: Yes    Patient's Behavior: Cooperative and Pleasant    Is Patient Social/Interacting: Yes    Relaxation:   Television:Yes   Reading:Yes   Game/Puzzle:No   Phone: No       Notes/Comments:      Please see patient's wrap up group sheet for patient's comments

## 2021-06-10 NOTE — PROGRESS NOTES
[facility] Adult Unit Daily Assessment  Nursing Progress Note    Room: [facility]/612-01   Name: Tammy Ch   Age: 27 y.o. Gender: male   Dx: <principal problem not specified>  Precautions: suicide risk  Inpatient Status: voluntary       SLEEP:    Sleep Quality Good  Sleep Medications: Yes   PRN Sleep Meds: No       MEDICAL:    Other PRN Meds: Yes   Med Compliant: Yes  Accu-Chek:   Oxygen/CPAP/BiPAP: No  CIWA/CINA: Yes   PAIN Assessment: none  Side Effects from medication: No    Is Patient experiencing any respiratory symptoms (headache, fever, body aches, cough. Mil Damian ): no  Patient educated by nursing to practice social distancing, wear masks, wash hands frequently: yes      PSYCH:    Depression: 4   Anxiety: 5   SI denies suicidal ideation   HI Negative for homicidal ideation      AVH:Absent      GENERAL:    Appetite: good    Social: Yes   Speech: normal   Appearance: appropriately dressed and healthy looking    GROUP:    Group Participation: Yes  Participation Quality: Interactive    Notes: Pt was seen in dayroom. Pt has moderate depression and anxiety. Pt denies SI, HI, AVH. Pt reports feeling depressed because he let people down he feels like. Pt said he needs to quit drinking because he has no off button. Pt says he isnt open at this time for any rehab due to having to work. Pt said he may go to meetings though. Pt is also interested he said in having something to [place]" him out. He feels he has  A bad temper at times.

## 2021-06-10 NOTE — PROGRESS NOTES
Treatment Team Note:    DAYNA met with [address] team to discuss Pts [address] plans. Progress/Behavior/Group Attendance: TBD    Target Symptoms/Reason for admission: Patient admitted to [facility] due to involved with him on several instances today. Patient states he has had anger issues and a significant drinking problem. States he drinks very heavily daily, sometimes 20-30 beers per night. States he has had shaking and tremors for withdrawals before but has never quit drinking wine after having more serious withdrawal symptoms. According to [facility] department, patient's family members reported he made several Facebook post recently talking about death and talking about going after certain people. He currently denies any suicidal or homicidal thoughts but does report some feelings of depression and hopelessness recently which he has mainly attributed to his alcohol abuse. Patient states he is interested in quitting drinking but does not know how.   Diagnoses: Depression unspecified , Rule out substance-induced mood disorder  Alcohol withdrawal Alcohol use disorder, severe, Nicotine dependence  UDS: Neg  BAL:  328     AftercarePlan: [address] with: sister    Collateral obtained from: sister  On:6/10/21    Family Session: MARIO ALBERTO    Misc:
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
## 2021-06-10 NOTE — PROGRESS NOTES
Requirement Note     SW met with pt to complete Psychosocial and CSSR-S on this date, and to discuss patient's long and short term goals, but pt was unable to assess. SW will attempt to complete these requirements again tomorrow.
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
## 2021-06-10 NOTE — PROGRESS NOTES
Admission Note      Reason for admission/Target Symptom: Patient admitted to [facility] due to   Diagnoses: Depression unspecified , Rule out substance-induced mood disorder  Alcohol withdrawal Alcohol use disorder, severe, Nicotine dependence  UDS: Neg  BAL:  12    SW met with treatment team to discuss patient's treatment including care planning, discharge planning, and follow-up needs. Pt has been admitted to [facility]. Treatment team has identified patient's discharge needs as medication management and outpatient therapy/counseling. Pt confirmed  the need for ongoing treatment post inpatient stay. Pt was also provided a handout of contact information for drug and alcohol treatment centers and other community support service such as [facility], AA, and [facility].

## 2021-06-10 NOTE — PROGRESS NOTES
[facility] Daily Shift Assessment  Nursing Progress Note    Room: 0612/612-01 Name: London Cordova Age: 27 y.o. Gender: male   Dx: Severe episode of recurrent major depressive disorder (HCC)  Precautions: suicide risk and seizure precautions  Target Symptoms:   Accu-Chek: NoSleep: Yes,Sleep Quality Good SI No AVH Denies [facility]  ADLs: Yes Speech: normal Depression: 8 Anxiety: 8   Participation LevelActive Listener  Appetite: Good  Respiratory symptoms: No Headache: No Body aches: No Fever: No Cough: No  Patients encouraged to wear masks, wash hands frequently and practice social distancing while on the unit: Yes  Visitation: No   Participation QualityAppropriate        Notes: pt was isolative the morning in his room. Pt denies SI HI and AVH at this times states that his depression and anxiety are at an 8 on scale of 1-10. Pt is med compliant and cooperative with care. Pt voices no complaints nor concerns. At the day progressed the pt because more social with fellow pts and spends the majority of his time in the tv area socializing and watching TV. Will continue to monitor pt this shift.

## 2021-06-11 LAB
ALBUMIN SERPL-MCNC: 3.9 G/DL (ref 3.5–5.2)
ALP BLD-CCNC: 60 U/L (ref 40–130)
ALT SERPL-CCNC: 96 U/L (ref 5–41)
AST SERPL-CCNC: 115 U/L (ref 5–40)
BILIRUB SERPL-MCNC: 1.1 MG/DL (ref 0.2–1.2)
BILIRUBIN DIRECT: 0.4 MG/DL (ref 0–0.3)
BILIRUBIN, INDIRECT: 0.7 MG/DL (ref 0.1–1)
TOTAL PROTEIN: 6.9 G/DL (ref 6.6–8.7)
TSH REFLEX FT4: 1.39 UIU/ML (ref 0.35–5.5)
VITAMIN B-12: 342 PG/ML (ref 211–946)
VITAMIN D 25-HYDROXY: 22.2 NG/ML

## 2021-06-11 PROCEDURE — 80076 HEPATIC FUNCTION PANEL: CPT

## 2021-06-11 PROCEDURE — 6370000000 HC RX 637 (ALT 250 FOR IP): Performed by: NURSE PRACTITIONER

## 2021-06-11 PROCEDURE — 99231 SBSQ HOSP IP/OBS SF/LOW 25: CPT | Performed by: NURSE PRACTITIONER

## 2021-06-11 PROCEDURE — 36415 COLL VENOUS BLD VENIPUNCTURE: CPT

## 2021-06-11 PROCEDURE — 84443 ASSAY THYROID STIM HORMONE: CPT

## 2021-06-11 PROCEDURE — 6370000000 HC RX 637 (ALT 250 FOR IP): Performed by: FAMILY MEDICINE

## 2021-06-11 PROCEDURE — 82306 VITAMIN D 25 HYDROXY: CPT

## 2021-06-11 PROCEDURE — 1240000000 HC EMOTIONAL WELLNESS R&B

## 2021-06-11 PROCEDURE — 6370000000 HC RX 637 (ALT 250 FOR IP): Performed by: PSYCHIATRY & NEUROLOGY

## 2021-06-11 PROCEDURE — 82607 VITAMIN B-12: CPT

## 2021-06-11 RX ORDER — CHOLECALCIFEROL (VITAMIN D3) 125 MCG
500 CAPSULE ORAL DAILY
Status: DISCONTINUED | OUTPATIENT
Start: 2021-06-11 | End: 2021-06-14 | Stop reason: HOSPADM

## 2021-06-11 RX ORDER — AMLODIPINE BESYLATE 5 MG/1
2.5 TABLET ORAL DAILY
Status: DISCONTINUED | OUTPATIENT
Start: 2021-06-12 | End: 2021-06-14 | Stop reason: HOSPADM

## 2021-06-11 RX ORDER — FOLIC ACID 1 MG/1
1 TABLET ORAL DAILY
Status: DISCONTINUED | OUTPATIENT
Start: 2021-06-11 | End: 2021-06-14 | Stop reason: HOSPADM

## 2021-06-11 RX ORDER — TRAZODONE HYDROCHLORIDE 50 MG/1
50 TABLET ORAL NIGHTLY
Status: DISCONTINUED | OUTPATIENT
Start: 2021-06-11 | End: 2021-06-14

## 2021-06-11 RX ORDER — GAUZE BANDAGE 2" X 2"
100 BANDAGE TOPICAL DAILY
Status: DISCONTINUED | OUTPATIENT
Start: 2021-06-11 | End: 2021-06-14 | Stop reason: HOSPADM

## 2021-06-11 RX ADMIN — CLONIDINE HYDROCHLORIDE 0.1 MG: 0.1 TABLET ORAL at 20:13

## 2021-06-11 RX ADMIN — CYANOCOBALAMIN TAB 500 MCG 500 MCG: 500 TAB at 10:18

## 2021-06-11 RX ADMIN — NALTREXONE HYDROCHLORIDE 50 MG: 50 TABLET, FILM COATED ORAL at 08:20

## 2021-06-11 RX ADMIN — FOLIC ACID 1 MG: 1 TABLET ORAL at 08:21

## 2021-06-11 RX ADMIN — Medication 3 MG: at 20:13

## 2021-06-11 RX ADMIN — TRAZODONE HYDROCHLORIDE 50 MG: 50 TABLET ORAL at 22:21

## 2021-06-11 RX ADMIN — THIAMINE HCL TAB 100 MG 100 MG: 100 TAB at 08:21

## 2021-06-11 RX ADMIN — SERTRALINE HYDROCHLORIDE 25 MG: 25 TABLET ORAL at 08:20

## 2021-06-11 ASSESSMENT — PAIN SCALES - GENERAL
PAINLEVEL_OUTOF10: 0
PAINLEVEL_OUTOF10: 0

## 2021-06-11 NOTE — PROGRESS NOTES
[address] [place] [address]      Psychiatric Progress Note    Name:  Tramaine Mcconnell  Date:  6/11/2021  Age:  27 y.o. Sex:  male  Ethnicity:   Primary Care Physician:  No primary care provider on file. Patient Care Team:  No care team member to display  Chief Complaint: \" I am feeling better today. \"        Historian:patient  Complaint Type: anxiety, decreased appetite, depression, excessive alcohol consumption, loss of interest in favorite activities, sleep disturbance and tobacco use  Course of Symptoms: ongoing  Precipitating Factors: alcohol use disorder           Subjective  Nursing notes were reviewed and patient had no behavioral issues during the night. As needed medications administered last night include Clonidine. Today he denies suicidal ideation, homicidal ideation and psychosis. CIWA was 0 last night. He has slight bilateral hand tremors this morning. He denies alcohol cravings today. He reports that he has \"felt better already\" since taking Zoloft for 1 dose. He was again educated in inpatient chemical dependency treatment and he declines. Reports that he will lose his job if he does inpatient. He is willing to start outpatient CD treatment at [facility] and [facility]. Patient has been calm and cooperative with staff and peers. Patient has been compliant with medications. Patient has been attending groups. Patient reports appetite as \"it is getting better. \" Patient reports no side effects from medications. Objective  Vitals:    06/11/21 0744   BP: (!) 147/95   Pulse: 69   Resp: 18   Temp: 96.2 °F (35.7 °C)   SpO2: 98%       Previous Psychiatric/Substance Use History      Medical History:  No past medical history on file.      SEAY History:   Social History     Substance and Sexual Activity   Alcohol Use Yes    Comment: 20 pack         Social History     Substance and Sexual Activity   Drug Use No        Social History     Tobacco Use   Smoking Status Current Every Day Smoker    Packs/day: 1.00    Types: Cigarettes   Smokeless Tobacco Never Used        Family History:     No family history on file. Mental Status:  Level of consciousness:  within normal limits and awake  Appearance:  well-appearing, street clothes, in chair, good grooming and good hygiene  Behavior/Motor:  no abnormalities noted  Attitude toward examiner:  cooperative, attentive and good eye contact  Speech:  normal rate and normal volume  Mood: \" I am feeling ok today. \"  Affect:  mood congruent  Thought processes:  linear and goal directed  Thought content:  Homocidal ideation : denies  Suicidal Ideation:  denies suicidal ideation  Delusions:  no evidence of delusions  Perceptual Disturbance:  denies any perceptual disturbance  Cognition:  oriented to person, place, and time  Concentration : good  Memory intact for recent and remote  Fund of knowledge:  average  Abstract thinking:  adequate  Insight: good  Judgment:  good     thiamine mononitrate  100 mg Oral Daily    folic acid  1 mg Oral Daily    vitamin B-12  500 mcg Oral Daily    sertraline  25 mg Oral Daily    naltrexone  50 mg Oral Daily with breakfast    melatonin  3 mg Oral Nightly    nicotine  1 patch Transdermal Daily       Current Medications:  Current Facility-Administered Medications   Medication Dose Route Frequency Provider Last Rate Last Admin    thiamine mononitrate tablet 100 mg  100 mg Oral Daily Riaz Woodall MD   100 mg at 66/55/60 8918    folic acid (FOLVITE) tablet 1 mg  1 mg Oral Daily Riaz Woodall MD   1 mg at 06/11/21 7633    vitamin B-12 (CYANOCOBALAMIN) tablet 500 mcg  500 mcg Oral Daily Riaz Woodall MD   500 mcg at 06/11/21 1018    cloNIDine (CATAPRES) tablet 0.1 mg  0.1 mg Oral Q4H PRN Barbara Console, APRN   0.1 mg at 06/10/21 2043    sertraline (ZOLOFT) tablet 25 mg  25 mg Oral Daily Barbara Console, APRN   25 mg at 06/11/21 0820    naltrexone (DEPADE) tablet 50 mg  50 mg Oral Daily with breakfast ZAY Williamson   50 mg at 06/11/21 0820    acetaminophen (TYLENOL) tablet 650 mg  650 mg Oral Q4H PRN Aristeo Pickett MD        polyethylene glycol (GLYCOLAX) packet 17 g  17 g Oral Daily PRN Aristeo Pickett MD        melatonin tablet 3 mg  3 mg Oral Nightly Aristeo Pickett MD   3 mg at 06/10/21 2041    nicotine (NICODERM CQ) 14 MG/24HR 1 patch  1 patch Transdermal Daily Aristeo Pickett MD   1 patch at 06/11/21 7056    LORazepam (ATIVAN) tablet 1 mg  1 mg Oral Q4H PRN Aristeo Pickett MD   1 mg at 06/09/21 2119    LORazepam (ATIVAN) tablet 2 mg  2 mg Oral Q4H PRN Aristeo Pickett MD           Psychotherapy:   SUPPORTIVE    DSM V Diagnoses:    Principal Problem:    Severe episode of recurrent major depressive disorder ([facility]  Active Problems:    Alcohol use disorder, severe, in early remission, in controlled environment, dependence ([facility]    Vapes nicotine containing substance    Severe recurrent major depression with psychotic features ([facility]  Resolved Problems:    * No resolved hospital problems. *            Plan:    Encourage group therapy  15 minute safety checks  Medical monitoring by Dr. Idalmis Mcknight and associates  Continue current therapy and medications  Possible discharge Monday    Amount of time spent with patient:  15 minutes with greater than 50% of the time spent in counseling and collaboration of care.     ZAY Williamson  Clinician Signature: signed electronically

## 2021-06-11 NOTE — PROGRESS NOTES
[facility] Adult Unit Daily Assessment  Nursing Progress Note    Room: 06/612-01   Name: Lei Conn   Age: 27 y.o. Gender: male   Dx: Severe episode of recurrent major depressive disorder (HCC)  Precautions: suicide risk  Inpatient Status: voluntary       SLEEP:    Sleep Quality Good  Sleep Medications: Yes, melatonin 3mg   PRN Sleep Meds: No       MEDICAL:    Other PRN Meds: Yes   Med Compliant: Yes  Accu-Chek: No  Oxygen/CPAP/BiPAP: No  CIWA/CINA: yes  PAIN Assessment: none  Side Effects from medication: No    Is Patient experiencing any respiratory symptoms (headache, fever, body aches, cough. Willetta Smiles ): no  Patient educated by nursing to practice social distancing, wear masks, wash hands frequently: yes      PSYCH:    Depression: 0   Anxiety: 0   SI denies suicidal ideation   HI Negative for homicidal ideation      AVH:Absent      GENERAL:    Appetite: good    Social: Yes   Speech: normal   Appearance: appropriately dressed and healthy looking    GROUP:    Group Participation: Yes  Participation Quality: Interactive    Notes: Patient was withdrawn, poor eye contact, and flat effect during the interview. Patient stated that he had been drinking heavy the last 6-7 months. 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 Patient reported that he knows he is an alcoholic. Patient stated that he does not want to go to an inpatient rehab because of risk losing his job. Patient mentioned that he would be willing to go to out patient rehab. He was social and watched television with his peers. Nurse educated the patient on addiction and treatment. Blood pressure was 170/104, clonidine 0.1 mg given. Continue to monitor.          Electronically signed by Ruby Garcia RN on 6/10/21 at 11:49 PM CDT

## 2021-06-11 NOTE — PROGRESS NOTES
Requirement Note     SW met with pt to complete Psychosocial and CSSR-S on this date. Patients long and short term goals discussed. Patient voiced understanding. Treatment plan sheet signed. Patient verbalized understanding of the treatment plan. Patient participated in goals and objectives of the treatment plan. Patient completed safety plan with , patient received copy of plan, and original was placed into patient's chart. SW explained treatment goals with pt. Decreasing depression and anxiety by improvement of positive coping patterns was discussed. Pt acknowledged understanding of treatment goals and signed treatment plan signature sheet. In the last 6 months has the pt been a danger to self: YES  In the last 6 months has the pt been a danger to others: NO  Legal Guardian/POA: NO     Provided patient with [employer] handout entitled \"Quitting Smoking. \"  Reviewed handout with patient: addressing dangers of smoking, developing coping skills, and providing basic information about quitting. Patient received all components practical counseling of tobacco practical counseling during the hospital stay.

## 2021-06-11 NOTE — GROUP NOTE
Group Therapy Note    Date: 6/11/2021    Group Start Time: 0800  Group End Time: 0830  Group Topic: Community Meeting    [facility] ADULT I    BRIELLE Luu        Group Therapy Note                                                                    Group Therapy Note    Date: 06/11/21  Start Time: 0800  End Time: 0830    Number of Participants: 5    Type of Group: Community Meeting    Patient's Goal: \"Going to enjoy the process and communicate with people\"     Notes:      Participation Level: Interactive    Participation Quality: Appropriate    Speech:  normal    Thought Process/Content: Logical    Affective Functioning: Congruent    Mood: calm    Level of consciousness:  Alert    Response to Learning: Able to verbalize current knowledge/experience, Able to verbalize/acknowledge new learning, and Able to retain information    Modes of Intervention: Education and Support    Discipline Responsible: Registered Nurse    Signature:  BRIELLE Luu

## 2021-06-11 NOTE — H&P
HISTORY and PHYSICAL      CHIEF COMPLAINT:  Depression, SI, HI    Reason for Admission:  Depression, SI, HI    History Obtained From:  Patient, chart    HISTORY OF PRESENT ILLNESS:      The patient is a 27 y.o. male who is admitted to the [place] [address] unit with worsening mood issues. He has no c/o CP or SOA. No abdominal pain or N/V. No dysuria. No new pain issues. No fevers. Past Medical History:    No past medical history on file. Past Surgical History:    No past surgical history on file. Medications Prior to Admission:    No medications prior to admission. Allergies:  Patient has no known allergies. Social History:   TOBACCO:   reports that he has been smoking cigarettes. He has been smoking about 1.00 pack per day. He has never used smokeless tobacco.  ETOH:   reports current alcohol use. DRUGS:   reports no history of drug use. MARITAL STATUS:  single  OCCUPATION:  He is working  Patient currently lives with family       Family History:   No family history on file. REVIEW OF SYSTEMS:  Constitutional: neg  CV: neg  Pulmonary: neg  GI: neg  : neg  Psych: depression, SI, HI  Neuro: neg  Skin: neg  MusculoSkeletal: neg  HEENT: neg  Joints: neg    Vitals:  BP (!) 141/73   Pulse 59   Temp 97.2 °F (36.2 °C)   Resp 16   SpO2 99%     PHYSICAL EXAM:  Gen: NAD, alert  HEENT: WNL  Lymph: no LAD  Neck: no JVD or masses  Chest: CTA bilat  CV: RRR  Abdomen: NT/ND  Extrem: no C/C/E  Neuro: non focal  Skin: no rashes  Joints: no redness    DATA:  I have reviewed the admission labs and imaging tests.     ASSESSMENT AND PLAN:      Principal Problem:    Severe episode of recurrent major depressive disorder, SI, HI--follow with Psych    ETOH Abuse    Elevated LFT's    Elevated BP        Jen Godwin MD  11:52 PM 6/10/2021

## 2021-06-11 NOTE — PROGRESS NOTES
[facility] Daily Shift Assessment  Nursing Progress Note    Room: 0612/612-01 Name: Gt Meng Age: 27 y.o. Gender: male   Dx: Severe episode of recurrent major depressive disorder (HCC)  Precautions: suicide risk  Target Symptoms:   Accu-Chek: NoSleep: Yes,Sleep Quality Very good SI No AVH Denies   [facility]  ADLs: No but plans to take a shower after lunch. Speech: normal Depression: 0 Anxiety: 0   Participation LevelActive Listener and Interactive  Appetite: Good  Respiratory symptoms: No Headache: No Body aches: No Fever: No Cough: No  Patients encouraged to wear masks, wash hands frequently and practice social distancing while on the unit: Yes  Visitation: No   Participation QualityAppropriate and Attentive    Complaints:    Notes: Patient pleasant and social during day. Patient did not go to the 10 oclock group but instead decided to try to take a nap. Patient denies depression, anxiety as well as SI, HI, AVH, and withdraw symptoms.      Signature:

## 2021-06-12 PROCEDURE — 6370000000 HC RX 637 (ALT 250 FOR IP): Performed by: NURSE PRACTITIONER

## 2021-06-12 PROCEDURE — 6370000000 HC RX 637 (ALT 250 FOR IP): Performed by: PSYCHIATRY & NEUROLOGY

## 2021-06-12 PROCEDURE — 1240000000 HC EMOTIONAL WELLNESS R&B

## 2021-06-12 PROCEDURE — 6370000000 HC RX 637 (ALT 250 FOR IP): Performed by: FAMILY MEDICINE

## 2021-06-12 PROCEDURE — 99231 SBSQ HOSP IP/OBS SF/LOW 25: CPT | Performed by: PSYCHIATRY & NEUROLOGY

## 2021-06-12 RX ADMIN — SERTRALINE HYDROCHLORIDE 25 MG: 25 TABLET ORAL at 09:40

## 2021-06-12 RX ADMIN — TRAZODONE HYDROCHLORIDE 50 MG: 50 TABLET ORAL at 21:00

## 2021-06-12 RX ADMIN — CYANOCOBALAMIN TAB 500 MCG 500 MCG: 500 TAB at 09:39

## 2021-06-12 RX ADMIN — NALTREXONE HYDROCHLORIDE 50 MG: 50 TABLET, FILM COATED ORAL at 09:39

## 2021-06-12 RX ADMIN — Medication 3 MG: at 21:01

## 2021-06-12 RX ADMIN — AMLODIPINE BESYLATE 2.5 MG: 5 TABLET ORAL at 09:39

## 2021-06-12 RX ADMIN — THIAMINE HCL TAB 100 MG 100 MG: 100 TAB at 09:39

## 2021-06-12 RX ADMIN — FOLIC ACID 1 MG: 1 TABLET ORAL at 09:39

## 2021-06-12 ASSESSMENT — PAIN SCALES - GENERAL: PAINLEVEL_OUTOF10: 0

## 2021-06-12 NOTE — PROGRESS NOTES
[facility] Daily Shift Assessment  Nursing Progress Note    Room: 0612/612-01 Name: Mago Hernandez Age: 27 y.o. Gender: male   Dx: Severe episode of recurrent major depressive disorder (HCC)  Precautions: suicide risk  Target Symptoms:   Accu-Chek: NoSleep: Yes,Sleep Quality Good SI No AVH denies  [address]  ADLs: Yes Speech: normal Depression: 1 Anxiety: 1   Participation LevelActive Listener  Appetite: Good  Respiratory symptoms: No Headache: No Body aches: No Fever: No Cough: No  Patients encouraged to wear masks, wash hands frequently and practice social distancing while on the unit: Yes  Visitation: No   Participation QualityAppropriate    Complaints:    Notes: Pt alert and oriented. Active listener and appropriate during interview. Completes ADLs, good appetite. Reports good sleep. Speech is normal. Pt has brightened affect. Distractible during interview. Mostly circumstantial thought process. Rates depression and anxiety 1. Denies SI, HI, and AVH. Will continue to monitor.      Signature:

## 2021-06-12 NOTE — PROGRESS NOTES
Progress Note  Molly General  6/11/2021 10:29 PM  Subjective:   Admit Date:   6/9/2021      CC/ADMIT DX:       Interval History:   Reviewed overnight events and nursing notes. He has no medical complaints. No HA or dizziness. No CP or SOA. I have reviewed all labs/diagnostics from the last 24hrs. ROS:   I have done a 10 point ROS and all are negative, except what is mentioned in the HPI. ADULT DIET; Regular    Medications:      thiamine mononitrate  100 mg Oral Daily    folic acid  1 mg Oral Daily    vitamin B-12  500 mcg Oral Daily    traZODone  50 mg Oral Nightly    [START ON 6/12/2021] amLODIPine  2.5 mg Oral Daily    sertraline  25 mg Oral Daily    naltrexone  50 mg Oral Daily with breakfast    melatonin  3 mg Oral Nightly    nicotine  1 patch Transdermal Daily           Objective:   Vitals: BP (!) 164/102   Pulse 92   Temp 97.2 °F (36.2 °C) (Temporal)   Resp 16   SpO2 96%  No intake or output data in the 24 hours ending 06/11/21 2229  General appearance: alert and cooperative with exam  Extremities: extremities normal, atraumatic, no cyanosis or edema  Neurologic:  No obvious focal neurologic deficits. Skin: no rashes     Assessment and Plan:   Principal Problem:    Severe episode of recurrent major depressive disorder (HCC)  Active Problems:    Vapes nicotine containing substance    Alcohol use disorder, severe, in early remission, in controlled environment, dependence ([address]    Severe recurrent major depression with psychotic features ([facility] [address]  Resolved Problems:    * No resolved hospital problems. *     HTN      Plan:  1. Continue present medication(s)   2.  Start treatment for elevated BP  3. Follow with Psych      Discharge planning:   home     Reviewed treatment plans with the patient and/or family.              Electronically signed by Lexi Alejandro MD on 6/11/2021 at 10:29 PM

## 2021-06-12 NOTE — PROGRESS NOTES
Department of Psychiatry  Attending Progress Note     Chief complaint: \"Doing so much better\"    SUBJECTIVE:   Chart reviewed, discussed with the team.  No major issues reported. Patient is social.  Participates in group activities. Poor sleep last night, was given trazodone. BP elevated this am.     Patient presents with bright affect this morning. Smiling. States he is doing much better. Understands that sobriety is very important. Planning to go to [place] [address] upon discharge. Vivitrol will be given prior to discharge. Patient states he wants to stay sober and do better in life. Processed his feelings. Supportive psychotherapy provided. Patient denies physical symptoms. No side effects on current medication regimen. OBJECTIVE    Physical  Wt Readings from Last 3 Encounters:   06/08/21 224 lb (101.6 kg)   11/29/18 224 lb 9.6 oz (101.9 kg)   11/19/18 224 lb (101.6 kg)     Temp Readings from Last 3 Encounters:   06/12/21 97 °F (36.1 °C) (Temporal)   06/09/21 98.1 °F (36.7 °C)   11/29/18 97.7 °F (36.5 °C) (Oral)     BP Readings from Last 3 Encounters:   06/12/21 (!) 147/75   06/09/21 (!) 143/72   11/29/18 (!) 145/90     Pulse Readings from Last 3 Encounters:   06/12/21 65   06/09/21 84   11/29/18 105        Review of Systems: 14-point review of systems negative except as described above    Mental Status Examination:   Appearance: Stated age. Casually dressed. Gait stable. No abnormal movements or tremor. Behavior: Calm, cooperative, pleasant, smiling  Speech: Normal in tone, volume, and quality. No slurring, dysarthria or pressured speech noted. Mood: \"Better\"   Affect: Mood congruent. Thought Process: Appears linear. Thought Content: Denies suicidal and homicidal ideation. No overt delusions or paranoia appreciated. Perceptions: Denies auditory or visual hallucinations at present time. Not responding to internal stimuli. Concentration: Intact. Orientation: to person, place, date, and situation. Language: Intact. Fund of information: Intact. Memory: Recent and remote appear intact. Neurovegitative: Fair appetite and sleep. Insight: Improving. Judgment: Improving.     Data  Lab Results   Component Value Date    WBC 4.6 (L) 06/08/2021    HGB 15.0 06/08/2021    HCT 43.1 06/08/2021    MCV 92.5 06/08/2021     06/08/2021      Lab Results   Component Value Date     06/09/2021    K 4.2 06/09/2021     06/09/2021    CO2 26 06/09/2021    BUN 5 (L) 06/09/2021    CREATININE 0.7 06/09/2021    GLUCOSE 89 06/09/2021    CALCIUM 8.9 06/09/2021    PROT 6.9 06/11/2021    LABALBU 3.9 06/11/2021    BILITOT 1.1 06/11/2021    ALKPHOS 60 06/11/2021     (H) 06/11/2021    ALT 96 (H) 06/11/2021    LABGLOM >60 06/09/2021    GFRAA >59 06/09/2021       Medications    Current Facility-Administered Medications:     thiamine mononitrate tablet 100 mg, 100 mg, Oral, Daily, Lisa Isaacs MD, 100 mg at 01/66/68 4679    folic acid (FOLVITE) tablet 1 mg, 1 mg, Oral, Daily, Lisa Isaacs MD, 1 mg at 06/12/21 0797    vitamin B-12 (CYANOCOBALAMIN) tablet 500 mcg, 500 mcg, Oral, Daily, Lisa Isaacs MD, 500 mcg at 06/12/21 0939    traZODone (DESYREL) tablet 50 mg, 50 mg, Oral, Nightly, Kyle Vincent MD, 50 mg at 06/11/21 2221    amLODIPine (NORVASC) tablet 2.5 mg, 2.5 mg, Oral, Daily, Lisa Isaacs MD, 2.5 mg at 06/12/21 4860    cloNIDine (CATAPRES) tablet 0.1 mg, 0.1 mg, Oral, Q4H PRN, Herlene Comas, APRN, 0.1 mg at 06/11/21 2013    sertraline (ZOLOFT) tablet 25 mg, 25 mg, Oral, Daily, Herlene Comas, APRN, 25 mg at 06/12/21 0940    naltrexone (DEPADE) tablet 50 mg, 50 mg, Oral, Daily with breakfast, Herlene Comas, APRN, 50 mg at 06/12/21 0939    acetaminophen (TYLENOL) tablet 650 mg, 650 mg, Oral, Q4H PRN, Kyle Vincent MD    polyethylene glycol (GLYCOLAX) packet 17 g, 17 g, Oral, Daily PRN, Kyle Vincent MD    melatonin tablet 3 mg, 3 mg, Oral, Nightly, Melva Pino Solano MD, 3 mg at 06/11/21 2013    nicotine (NICODERM CQ) 14 MG/24HR 1 patch, 1 patch, Transdermal, Daily, Jolynn Grider MD, 1 patch at 06/12/21 0939    LORazepam (ATIVAN) tablet 1 mg, 1 mg, Oral, Q4H PRN, Jolynn Grider MD, 1 mg at 06/09/21 2119    LORazepam (ATIVAN) tablet 2 mg, 2 mg, Oral, Q4H PRN, Jolynn Grider MD    ASSESSMENT AND PLAN  DSM 5 DIAGNOSIS  Impression  Depression unspecified   Alcohol use disorder, severe  Nicotine dependence  Transaminitis    Patient continues to improve. Observe. Discharge on Monday if stable. Plan:   1. Psychiatric Medications:   Continue current psychotropic medications as recommended. Plan to give Vivitrol on Monday. Monitor for side effects. The risks, benefits, side effects, indications, contraindications, alternatives and adverse effects of the medications have been discussed with patient. 2. Continue to provide supportive psychotherapy. Encourage socialization and participation in recreational activities. Work on coping skills. 3. Medical Issues:    Continue medical monitoring by Dr. Bret El and associates. Recheck [facility].    4. Disposition:     to provide outpatient resources and facilitate disposition.      Amount of time spent with patient:      15 minutes with greater than 50 % of the time spent in counseling and collaboration of care

## 2021-06-12 NOTE — PROGRESS NOTES
Treatment Team Note:     DAYNA met with [address] team to discuss Pts TX and DC plans.      Progress/Behavior/Group Attendance: TBD     Target Symptoms/Reason for admission: Patient admitted to [facility] due to involved with him on several instances today. Amada Courtney states he has had anger issues and a significant drinking problem.  States he drinks very heavily daily, sometimes 20-30 beers per night.  States he has had shaking and tremors for withdrawals before but has never quit drinking wine after having more serious withdrawal symptoms.  According to [facility] department, patient's family members reported he made several Facebook post recently talking about death and talking about going after certain people. Vidya Canales currently denies any suicidal or homicidal thoughts but does report some feelings of depression and hopelessness recently which he has mainly attributed to his alcohol abuse. Amada Courtney states he is interested in quitting drinking but does not know how.   Diagnoses: Depression unspecified , Rule out substance-induced mood disorder  Alcohol withdrawal Alcohol use disorder, severe, Nicotine dependence  UDS: Neg  FMY:  468     AftercarePlan: [address] lives with: sister     Collateral obtained from: sister  On:6/10/21     Family Session: MARIO ALBERTO     Misc:

## 2021-06-12 NOTE — PROGRESS NOTES
[facility] Adult Unit Daily Assessment  Nursing Progress Note    Room: 0612/612-01   Name: Lei Conn   Age: 27 y.o. Gender: male   Dx: Severe episode of recurrent major depressive disorder (HCC)  Precautions: suicide risk  Inpatient Status: voluntary       SLEEP:    Sleep Quality Good  Sleep Medications: Yes, melatonin 3mg, trazodone 50mg   PRN Sleep Meds: No       MEDICAL:    Other PRN Meds: No   Med Compliant: Yes  Accu-Chek: No  Oxygen/CPAP/BiPAP: No  CIWA/CINA: Yes   PAIN Assessment: none  Side Effects from medication: No    Is Patient experiencing any respiratory symptoms (headache, fever, body aches, cough. Willetta Smiles ): no  Patient educated by nursing to practice social distancing, wear masks, wash hands frequently: yes      PSYCH:    Depression: 0   Anxiety: 0   SI denies suicidal ideation   HI Negative for homicidal ideation      AVH:Absent      GENERAL:    Appetite: good    Social: Yes   Speech: normal   Appearance: appropriately dressed and healthy looking    GROUP:    Group Participation: Yes  Participation Quality: Interactive    Notes:  Patient was flat, worried and avoids gaze during the interview. Patient was social and attended group this evening. He stated he had learned how to change his outlook to more positive than negative thinking. Patient reported that he was close to his grandparents. Patient stated he was planning on going to A. A[facility] meeting, counseling and staying on his medications. Continue to monitor.          Electronically signed by Ruby Garcia RN on 6/11/21 at 11:57 PM CDT

## 2021-06-13 LAB
ALBUMIN SERPL-MCNC: 4.2 G/DL (ref 3.5–5.2)
ALP BLD-CCNC: 63 U/L (ref 40–130)
ALT SERPL-CCNC: 299 U/L (ref 5–41)
ANION GAP SERPL CALCULATED.3IONS-SCNC: 8 MMOL/L (ref 7–19)
AST SERPL-CCNC: 307 U/L (ref 5–40)
BILIRUB SERPL-MCNC: 0.9 MG/DL (ref 0.2–1.2)
BUN BLDV-MCNC: 9 MG/DL (ref 6–20)
CALCIUM SERPL-MCNC: 9.7 MG/DL (ref 8.6–10)
CHLORIDE BLD-SCNC: 98 MMOL/L (ref 98–111)
CO2: 28 MMOL/L (ref 22–29)
CREAT SERPL-MCNC: 0.7 MG/DL (ref 0.5–1.2)
GFR AFRICAN AMERICAN: >59
GFR NON-AFRICAN AMERICAN: >60
GLUCOSE BLD-MCNC: 91 MG/DL (ref 74–109)
POTASSIUM SERPL-SCNC: 3.6 MMOL/L (ref 3.5–5)
SODIUM BLD-SCNC: 134 MMOL/L (ref 136–145)
TOTAL PROTEIN: 7.2 G/DL (ref 6.6–8.7)

## 2021-06-13 PROCEDURE — 80053 COMPREHEN METABOLIC PANEL: CPT

## 2021-06-13 PROCEDURE — 6370000000 HC RX 637 (ALT 250 FOR IP): Performed by: PSYCHIATRY & NEUROLOGY

## 2021-06-13 PROCEDURE — 6370000000 HC RX 637 (ALT 250 FOR IP): Performed by: FAMILY MEDICINE

## 2021-06-13 PROCEDURE — 36415 COLL VENOUS BLD VENIPUNCTURE: CPT

## 2021-06-13 PROCEDURE — 1240000000 HC EMOTIONAL WELLNESS R&B

## 2021-06-13 PROCEDURE — 6370000000 HC RX 637 (ALT 250 FOR IP): Performed by: NURSE PRACTITIONER

## 2021-06-13 RX ADMIN — CYANOCOBALAMIN TAB 500 MCG 500 MCG: 500 TAB at 08:42

## 2021-06-13 RX ADMIN — NALTREXONE HYDROCHLORIDE 50 MG: 50 TABLET, FILM COATED ORAL at 08:42

## 2021-06-13 RX ADMIN — TRAZODONE HYDROCHLORIDE 50 MG: 50 TABLET ORAL at 21:23

## 2021-06-13 RX ADMIN — THIAMINE HCL TAB 100 MG 100 MG: 100 TAB at 08:42

## 2021-06-13 RX ADMIN — AMLODIPINE BESYLATE 2.5 MG: 5 TABLET ORAL at 08:43

## 2021-06-13 RX ADMIN — SERTRALINE HYDROCHLORIDE 25 MG: 25 TABLET ORAL at 08:43

## 2021-06-13 RX ADMIN — Medication 3 MG: at 21:23

## 2021-06-13 RX ADMIN — FOLIC ACID 1 MG: 1 TABLET ORAL at 08:43

## 2021-06-13 ASSESSMENT — PAIN SCALES - GENERAL: PAINLEVEL_OUTOF10: 0

## 2021-06-13 NOTE — SUICIDE SAFETY PLAN
SAFETY PLAN    A suicide Safety Plan is a document that supports someone when they are having thoughts of suicide. Warning Signs that indicate a suicidal crisis may be developing: What (situations, thoughts, feelings, body sensations, behaviors, etc.) do you experience that lets you know you are beginning to think about suicide? 1. When buying any quantity of alcohol  2. When visiting a bar  3. Feeling anger    Internal Coping Strategies:  What things can I do (relaxation techniques, hobbies, physical activities, etc.) to take my mind off my problems without contacting another person? 1. Execise  2. Listen to music  3. Research my trade, new tools, new codes and ways to do plumbing    People and social settings that provide distraction: Who can I call or where can I go to distract me? 1. Patient gave no useful answers for this question. People whom I can ask for help: Who can I call when I need help - for example, friends, family, clergy, someone else? 1. Name: Radha Sandy                Phone: [phone]  2. Name: Kelle Salamanca  Phone: [phone]  3. Name: Momo Bahena  Phone: [phone]    Professionals or [address] [place] [address] I can contact during a crisis: Who can I call for help - for example, my doctor, my psychiatrist, my psychologist, a mental health provider, a suicide hotline? 1. Clinician Name: [id number] [address]no   Phone: [phone]      Clinician Pager or Emergency Contact #: [phone]    2. Clinician Name: [address]   Phone: [phone]      Clinician Pager or Emergency Contact #: [phone]    3. Suicide Prevention Lifeline: 0-503-006-TALK (7342)    4. [address] [place] [address] Emergency Services -  for example, [facility], [facility] suicide hotline, [facility] Hotline: [facility] IVELISSE DALY Emergency Department      Emergency Services Address: [address]  [address]       Emergency Services Phone:

## 2021-06-13 NOTE — PROGRESS NOTES
Group Therapy Note    Start Time: 800  End Time:  101  Number of Participants: 7    Type of Group: Community Meeting       Patient's Goal:  \"weekend relax and not be stressed\"      Notes:      Participation Level:  Active Listener       Participation Quality: Appropriate      Thought Process/Content: Logical      Affective Functioning: Congruent      Mood: calm      Level of consciousness:  Alert      Modes of Intervention: Support      Discipline Responsible: Behavioral Health Tech II      Signature:  Luna Treviño

## 2021-06-13 NOTE — PROGRESS NOTES
[facility] Adult Unit Daily Assessment  Nursing Progress Note    Room: 06/612-01   Name: Sherry Scott   Age: 27 y.o. Gender: male   Dx: Severe episode of recurrent major depressive disorder (HCC)  Precautions: suicide risk and seizure precautions  Inpatient Status: voluntary       SLEEP:    Sleep Quality Good  Sleep Medications: Yes ; Trazadone 50mg, Melatonin 3mg  PRN Sleep Meds: No       MEDICAL:    Other PRN Meds: No   Med Compliant: Yes  Accu-Chek: No  Oxygen/CPAP/BiPAP: No  CIWA/CINA: Yes; 0   PAIN Assessment: denies  Side Effects from medication: none voiced    Is Patient experiencing any respiratory symptoms (headache, fever, body aches, cough. Clement Fly ): no  Patient educated by nursing to practice social distancing, wear masks, wash hands frequently: yes      PSYCH:    Depression: 1   Anxiety: 0   SI denies suicidal ideation   HI Negative for homicidal ideation      AVH:denies      GENERAL:    Appetite: good    Social: Yes   Speech: normal   Appearance: appropriately dressed, appropriately groomed and healthy looking    GROUP:    Group Participation: Yes  Participation Quality: Interactive    Notes: Pt has been calm and cooperative tonight with a bright affect. Pt denies anxiety and rates depression a 1/10. Pt denies SI, HI, and AVH. Pt is social with his peers and staff and is med compliant. Pt is aware of his treatment plan and is looking forward to working his plan. Pt voices some minimal concerns about being able to maintain sobriety upon discharge. Positive reinforcement and encouragement provided. Reviewed pt's support outlets. CIWA score is 0 tonight. Pt has rested well since going to bed. Monitoring for safety continues as ordered.      Electronically signed by Evaline Closs, RN on 6/13/2021 at 1:30 AM

## 2021-06-14 VITALS
HEART RATE: 76 BPM | RESPIRATION RATE: 18 BRPM | OXYGEN SATURATION: 100 % | SYSTOLIC BLOOD PRESSURE: 141 MMHG | DIASTOLIC BLOOD PRESSURE: 82 MMHG | TEMPERATURE: 96.5 F

## 2021-06-14 PROBLEM — F32.A DEPRESSION: Status: ACTIVE | Noted: 2021-06-14

## 2021-06-14 PROCEDURE — 99238 HOSP IP/OBS DSCHRG MGMT 30/<: CPT | Performed by: NURSE PRACTITIONER

## 2021-06-14 PROCEDURE — 6370000000 HC RX 637 (ALT 250 FOR IP): Performed by: PSYCHIATRY & NEUROLOGY

## 2021-06-14 PROCEDURE — 5130000000 HC BRIDGE APPOINTMENT

## 2021-06-14 PROCEDURE — 6370000000 HC RX 637 (ALT 250 FOR IP): Performed by: NURSE PRACTITIONER

## 2021-06-14 PROCEDURE — 6370000000 HC RX 637 (ALT 250 FOR IP): Performed by: FAMILY MEDICINE

## 2021-06-14 RX ORDER — THIAMINE MONONITRATE (VIT B1) 100 MG
100 TABLET ORAL DAILY
Qty: 14 TABLET | Refills: 0 | Status: SHIPPED | OUTPATIENT
Start: 2021-06-15 | End: 2021-06-29

## 2021-06-14 RX ORDER — AMLODIPINE BESYLATE 2.5 MG/1
2.5 TABLET ORAL DAILY
Qty: 14 TABLET | Refills: 0 | Status: SHIPPED | OUTPATIENT
Start: 2021-06-15 | End: 2021-06-29

## 2021-06-14 RX ORDER — ACAMPROSATE CALCIUM 333 MG/1
333 TABLET, DELAYED RELEASE ORAL 3 TIMES DAILY
Status: DISCONTINUED | OUTPATIENT
Start: 2021-06-14 | End: 2021-06-14 | Stop reason: HOSPADM

## 2021-06-14 RX ORDER — ACAMPROSATE CALCIUM 333 MG/1
333 TABLET, DELAYED RELEASE ORAL 3 TIMES DAILY
Qty: 42 TABLET | Refills: 0 | Status: SHIPPED | OUTPATIENT
Start: 2021-06-14 | End: 2021-06-28

## 2021-06-14 RX ORDER — FOLIC ACID 1 MG/1
1 TABLET ORAL DAILY
Qty: 14 TABLET | Refills: 0 | Status: SHIPPED | OUTPATIENT
Start: 2021-06-15 | End: 2021-06-29

## 2021-06-14 RX ADMIN — AMLODIPINE BESYLATE 2.5 MG: 5 TABLET ORAL at 08:18

## 2021-06-14 RX ADMIN — SERTRALINE HYDROCHLORIDE 50 MG: 50 TABLET ORAL at 08:45

## 2021-06-14 RX ADMIN — THIAMINE HCL TAB 100 MG 100 MG: 100 TAB at 08:18

## 2021-06-14 RX ADMIN — NALTREXONE HYDROCHLORIDE 50 MG: 50 TABLET, FILM COATED ORAL at 08:17

## 2021-06-14 RX ADMIN — ACAMPROSATE CALCIUM 333 MG: 333 TABLET, DELAYED RELEASE ORAL at 10:46

## 2021-06-14 RX ADMIN — CYANOCOBALAMIN TAB 500 MCG 500 MCG: 500 TAB at 08:18

## 2021-06-14 RX ADMIN — FOLIC ACID 1 MG: 1 TABLET ORAL at 08:18

## 2021-06-14 NOTE — PLAN OF CARE
Group Therapy Note     Date: 6/14/2021  Start Time: 1100  End Time:  1475  Number of Participants: 3     Type of Group: Psychoeducation     Wellness Binder Information  Module Name:  staying well  Session Number:  1     Patient's Goal:  daily maintenance and coping skills     Notes:  pt acknowledged use of positive coping skills daily to help stay well.      Status After Intervention:  Improved     Participation Level: Interactive     Participation Quality: Appropriate, Attentive, and Sharing        Speech:  normal        Thought Process/Content: Logical        Affective Functioning: Congruent        Mood: congruent        Level of consciousness:  Alert, Oriented x4, and Attentive        Response to Learning: Able to verbalize current knowledge/experience        Endings: None Reported     Modes of Intervention: Education        Discipline Responsible: Psychoeducational Specialist        Signature:  Delroy Pritchard
Problem: Discharge Planning:  Goal: Discharged to appropriate level of care  Description: Discharged to appropriate level of care  Outcome: Ongoing     Problem: Health Maintenance - Impaired:  Goal: Ability to perform activities of daily living will improve  Description: Ability to perform activities of daily living will improve  Outcome: Ongoing  Goal: Able to sleep without medication for appropriate length of time  Description: Able to sleep without medication for appropriate length of time  Outcome: Ongoing  Goal: Maintenance of adequate nutrition will improve  Description: Maintenance of adequate nutrition will improve  Outcome: Ongoing     Problem: Mood - Altered:  Goal: Mood stable  Description: Mood stable  Outcome: Ongoing     Problem: Self-Esteem - Low:  Goal: Demonstrates positive self-esteem  Description: Demonstrates positive self-esteem  Outcome: Ongoing     Problem: Cerebrospinal Fluid Leakage - Risk Of:  Goal: Demonstration of organized thought processes  Description: Demonstration of organized thought processes  Outcome: Ongoing     Problem: Violence - Risk of, Self/Other-Directed:  Goal: Knowledge of developmental care interventions  Description: Absence of violence  Outcome: Ongoing
Problem: Discharge Planning:  Goal: Discharged to appropriate level of care  Description: Discharged to appropriate level of care  Outcome: Ongoing     Problem: Health Maintenance - Impaired:  Goal: Ability to perform activities of daily living will improve  Description: Ability to perform activities of daily living will improve  Outcome: Ongoing  Goal: Able to sleep without medication for appropriate length of time  Description: Able to sleep without medication for appropriate length of time  Outcome: Ongoing  Goal: Maintenance of adequate nutrition will improve  Description: Maintenance of adequate nutrition will improve  Outcome: Ongoing     Problem: Mood - Altered:  Goal: Mood stable  Description: Mood stable  Outcome: Ongoing     Problem: Self-Esteem - Low:  Goal: Demonstrates positive self-esteem  Description: Demonstrates positive self-esteem  Outcome: Ongoing     Problem: Cerebrospinal Fluid Leakage - Risk Of:  Goal: Demonstration of organized thought processes  Description: Demonstration of organized thought processes  Outcome: Ongoing     Problem: Violence - Risk of, Self/Other-Directed:  Goal: Knowledge of developmental care interventions  Description: Absence of violence  Outcome: Ongoing
Problem: Discharge Planning:  Goal: Discharged to appropriate level of care  Description: Discharged to appropriate level of care  Outcome: Ongoing     Problem: Health Maintenance - Impaired:  Goal: Ability to perform activities of daily living will improve  Description: Ability to perform activities of daily living will improve  Outcome: Ongoing  Goal: Able to sleep without medication for appropriate length of time  Description: Able to sleep without medication for appropriate length of time  Outcome: Ongoing  Goal: Maintenance of adequate nutrition will improve  Description: Maintenance of adequate nutrition will improve  Outcome: Ongoing     Problem: Mood - Altered:  Goal: Mood stable  Description: Mood stable  Outcome: Ongoing     Problem: Self-Esteem - Low:  Goal: Demonstrates positive self-esteem  Description: Demonstrates positive self-esteem  Outcome: Ongoing     Problem: Violence - Risk of, Self/Other-Directed:  Goal: Knowledge of developmental care interventions  Description: Absence of violence  Outcome: Ongoing
Problem: Health Maintenance - Impaired:  Goal: Ability to perform activities of daily living will improve  Description: Ability to perform activities of daily living will improve  6/14/2021 0900 by Bradford Elizondo RN  Outcome: Completed   Group Therapy Note     Date: 6/14/2021  Start Time: 1000  End Time:  0073  Number of Participants: 4     Type of Group: Psychotherapy     Patient's Goal: Patient will process emotions and actions and how to relate to other or their with others. Patient discussed open communication and feelings and emotions. Notes:  Patient attended process group as scheduled, patient identified a issue to work on today regarding how they will interact and relate to others. Status After Intervention:  Improved     Participation Level:  Active Listener     Participation Quality: Appropriate, Attentive, and Sharing        Speech:  normal        Thought Process/Content: Logical        Affective Functioning: Congruent        Mood: euthymic        Level of consciousness:  Alert        Response to Learning: Able to verbalize current knowledge/experience        Endings: None Reported     Modes of Intervention: Education, Support, and Socialization        Discipline Responsible: /Counselor        Signature:  Elizabeth Galvan [facility]
Problem: Mood - Altered:  Goal: Mood stable  6/11/2021 1330 by Latonia Pacheco  Outcome: Ongoing                                                                       Group Therapy Note    Date: 6/11/2021  Start Time: 1300  End Time:  1330  Number of Participants: 2    Type of Group: Recovery    Wellness Binder Information  Module Name:  staying well  Session Number:  1    Patient's Goal:  daily maintenance and coping skills    Notes:  pt acknowledged use of positive coping skills daily to help stay well.     Status After Intervention:  Improved    Participation Level: Interactive    Participation Quality: Appropriate, Attentive, and Sharing      Speech:  normal      Thought Process/Content: Logical      Affective Functioning: Congruent      Mood: congruent      Level of consciousness:  Alert, Oriented x4, and Attentive      Response to Learning: Able to verbalize current knowledge/experience      Endings: None Reported    Modes of Intervention: Education      Discipline Responsible: Psychoeducational Specialist      Signature:  Latonia Pacheco
Problem: Mood - Altered:  Goal: Mood stable  Description: Mood stable  Outcome: Ongoing     Problem: Self-Esteem - Low:  Goal: Demonstrates positive self-esteem  Description: Demonstrates positive self-esteem  Outcome: Ongoing
Appears well-rested  Outcome: Ongoing

## 2021-06-14 NOTE — PROGRESS NOTES
[facility]  Discharge Note  Bridge Appointment completed: Reviewed Discharge Instructions with patient. Patient verbalizes understanding and agreement with the discharge plan using the teachback method. Referral for Outpatient Tobacco Cessation Counseling, upon discharge (heidi X if applicable and completed):    ( )  Hospital staff assisted patient to call Quit Line or faxed referral                                   during hospitalization                  ( )  Recognizing danger situations (included triggers and roadblocks), if not completed on admission                    ( )  Coping skills (new ways to manage stress, exercise, relaxation techniques, changing routine, distraction), if not completed on admission                                                           ( )  Basic information about quitting (benefits of quitting, techniques in how to quit, available resources, if not completed on admission  ( ) Referral for counseling faxed to [facility]   ( ) Patient refused referral  ( ) Patient refused counseling  (x ) Patient refused smoking cessation medication upon discharge    Vaccinations (heidi X if applicable and completed):  ( ) Patient states already received influenza vaccine elsewhere  ( ) Patient received influenza vaccine during this hospitalization  ( ) Patient refused influenza vaccine at this time      Pt discharged with followings belongings:        Valuables retrieved from [employer] and returned to patient. Patient left department with staff   via ambulatory to private car  , discharged to home  . Patient education on aftercare instructions: yes  Patient verbalize understanding of AVS:  yes. Suicidal Ideations? No AVH? no HI?  Negative for homicidal ideation       Status EXAM upon discharge:  Status and Exam  Normal: Yes  Facial Expression: Brightened  Affect: Appropriate  Level of Consciousness: Alert  Mood:Normal: Yes  Mood: Other (Comment) (worry over being able to continue with sobriety after DC)  Motor Activity:Normal: Yes  Motor Activity: Decreased  Interview Behavior: Cooperative  Preception: [place] to Person, [place]  to Time, [place] to Place, [place] to Situation  Attention:Normal: Yes  Attention: Distractible  Thought Processes: Circumstantial  Thought Content:Normal: Yes  Thought Content: Preoccupations  Hallucinations: None  Delusions: No  Memory:Normal: Yes  Insight and Judgment: No  Insight and Judgment: Poor Insight  Present Suicidal Ideation: No  Present Homicidal Ideation: No

## 2021-06-14 NOTE — DISCHARGE SUMMARY
began drinking heavily. He is not interested in inpatient chemical dependency however is interested in outpatient meetings. Also wanting to start medication to help with his alcohol use disorder. Feels that he could benefit from an antidepressant as well. Denies any history of amber or hypomania. Denies current suicidal ideation, homicidal ideation and psychosis at this time. Hospital Course:   Patient was admitted to the adult behavioral health floor and was acclimated to the floor. Labs were reviewed and physical exam was completed by Dr. Arturo Ghosh and associates. Home medications were reconciled. GUILLAUME was obtained and reviewed. Collateral was obtained from the patients sister. Sister reported that he could not return to her home because of his alcohol use disorder. Medication changes were made and patient tolerated well with no side effects. Sertraline was initiated for depressed mood. Naltrexone was initially started for severe alcohol use disorder however patients liver enzymes began to increase so that medication was discontinued. He will not be able to tolerate Vivitrol at this time. Acamprosate was initiated instead for alcohol use disorder. He was started on CIWAS every 4 hours as well. He required Ativan for CIWA scores only 1 time during the hospitalization. Folic acid and thiamine were replaced related to alcohol use disorder as well. His blood pressure continued to be elevated even after alcohol withdrawal was completed. Dr. Arturo Ghosh initiated Amlodipine 2.5 mg po daily for elevated blood pressure. He was educated on inpatient and outpatient chemical dependency treatment and he reported he would start attending AA and [facility]. He is not able to complete inpatient treatment at this time related to needed to work. He understand the importance of sobriety. During his hospitalization he was behaviorally stable. He even helped other peers on the unit.  He reported that he enjoyed talking to GLUCOSE 91 06/13/2021    CALCIUM 9.7 06/13/2021      Lab Results   Component Value Date    TSHFT4 1.39 06/11/2021     Lab Results   Component Value Date    VITD25 22.2 (L) 06/11/2021     Results for Jonh Elmore (MRN [id number]) as of 6/14/2021 09:31   Ref. Range 6/11/2021 05:26   Albumin Latest Ref Range: 3.5 - 5.2 g/dL 3.9   Alk Phos Latest Ref Range: 40 - 130 U/L 60   ALT Latest Ref Range: 5 - 41 U/L 96 (H)   AST Latest Ref Range: 5 - 40 U/L 115 (H)   Bilirubin Latest Ref Range: 0.2 - 1.2 mg/dL 1.1   Bilirubin, Direct Latest Ref Range: 0.0 - 0.3 mg/dL 0.4 (H)   Bilirubin, Indirect Latest Ref Range: 0.1 - 1.0 mg/dL 0.7     Results for Jonh Elmore (MRN [id number]) as of 6/14/2021 09:31   Ref. Range 6/8/2021 17:54 6/9/2021 06:41 6/11/2021 05:26   Vitamin B-12 Latest Ref Range: 211 - 946 pg/mL   342   SARS-CoV-2, NAAT Latest Ref Range: Not Detected  Not Detected       Results for Jonh Elmore (MRN [id number]) as of 6/14/2021 09:31   Ref. Range 6/8/2021 16:28   Ethanol Lvl Latest Units: mg/dL 328     Treatments: therapies: RN and SW    Alert, Oriented X 4  Appearance:  Grooming and Hygiene attended to  Speech with Regular Rate and Rhythm  Eye Contact:  Good  No Psychomotor Agitation/Retardation Noted  Attitude:  Cooperative  Mood:  \"I am really feeling a lot better. \"  Affective: Congruent, appropriate to the situation, with a normal range and intensity  Thought Processes:  Coherently communicated, logical and goal oriented  Thought Content:  At this time  No Suicidal Ideation, No Homicidal Ideation, No Auditory or Visual   Hallucinations, No overt Delusions  Insight:  Present  Judgement:  Normal  Memory is intact for both remote and recent  Intellectual Functioning:  Within the [address] of Knowledge:  Adequate  Attention and Concentration:  Adequate        Discharge Exam:  Gait stable  Speaks in full sentences without shortness of air    Disposition: home    Patient Instructions:   Current Discharge Medication List START taking these medications    Details   acamprosate (CAMPRAL) 333 MG tablet Take 1 tablet by mouth 3 times daily for 14 days  Qty: 42 tablet, Refills: 0      amLODIPine (NORVASC) 2.5 MG tablet Take 1 tablet by mouth daily for 14 days  Qty: 14 tablet, Refills: 0      folic acid (FOLVITE) 1 MG tablet Take 1 tablet by mouth daily for 14 days  Qty: 14 tablet, Refills: 0      sertraline (ZOLOFT) 50 MG tablet Take 1 tablet by mouth daily for 14 days  Qty: 14 tablet, Refills: 0      thiamine mononitrate (THIAMINE) 100 MG tablet Take 1 tablet by mouth daily for 14 days  Qty: 14 tablet, Refills: 0      vitamin B-12 500 MCG tablet Take 1 tablet by mouth daily for 14 days  Qty: 14 tablet, Refills: 0           Activity: activity as tolerated  Diet: regular diet  Wound Care: none needed    Follow-up with   PCP in 2 weeks.     [address] in one week      Time worked: Less than 30 minutes    Participation:good    Electronically signed by ZAY Vigil on 6/14/2021 at 9:27 AM

## 2021-06-14 NOTE — PROGRESS NOTES
Discharge Note     Pt discharging on this date. Pt denies SI, HI, and AVH at this time. Pt reports improvement in behavior and is leaving unit in overall good condition. SW and pt discussed pt's follow up appointments and importance of attending appointments as scheduled, pt voiced understanding and agreement. Pt and SW also discussed pt safety plan and pt able to verbally identify: warning signs, coping strategies, places and people that help make the pt feel better/distract negative thoughts, friends/family/agencies/professionals the pt can reach out to in a crisis, and something that is important to the pt/worth living for. Pt provided the national suicide prevention hotline number (0-542-197-745-489-0076) as well as local community behavioral health [facility]) crisis number for emergencies (7-754-691-923-574-0208). Pt to follow up with:  [address] [place] [address]) on 06__/_17_/21 @ 1:00pm. [facility], patient will be seen on 06__/23__/21 @ 10:00am for the med management appt. Referral to out patient tobacco cessation counseling treatment:    Patient refused referral to outpatient tobacco cessation counseling    SW offered to assist pt with transportation, pt reports that he has reports that he has a ride home.

## 2021-06-14 NOTE — PROGRESS NOTES
CLINICAL PHARMACY NOTE: MEDS TO BEDS    Total # of Prescriptions Filled: 6   The following medications were delivered to the patient:  · Thiamine 100 mg  · Acamprosate Calcium 333 mg  · Vitamin B-12 500 mcg  · Sertraline 50 mg  · Amlodipine 2.5 mg  · Folic Acid 1 mg    Additional Documentation:    Handed scripts to [employer] (Rn) at pharmacy window.

## 2021-06-14 NOTE — PROGRESS NOTES
Group Therapy Note    Start Time: 800  End Time:  923  Number of Participants: 9    Type of Group: Community Meeting       Patient's Goal:  \" Groups, Relaxation \"      Notes:        Participation Level:  Active Listener       Participation Quality: Appropriate      Thought Process/Content: Logical      Affective Functioning: Congruent      Mood: Calm      Level of consciousness:  Alert      Modes of Intervention: Support      Discipline Responsible: Behavioral Health Tech II      Signature:  Christina Dyson

## 2021-08-03 PROCEDURE — U0004 COV-19 TEST NON-CDC HGH THRU: HCPCS | Performed by: FAMILY MEDICINE
